# Patient Record
Sex: FEMALE | Race: WHITE | NOT HISPANIC OR LATINO | Employment: UNEMPLOYED | URBAN - NONMETROPOLITAN AREA
[De-identification: names, ages, dates, MRNs, and addresses within clinical notes are randomized per-mention and may not be internally consistent; named-entity substitution may affect disease eponyms.]

---

## 2023-02-23 LAB
GENETICS TEST NAME-DATA CONVERSION: NORMAL
LAB MOLECULAR CA TECHNICAL NOTES: NORMAL
MISCELLANEUOUS TEST RESULT: NORMAL
NAME OF SENDOUT TEST: NORMAL

## 2023-02-25 LAB — LAB MOLECULAR CA TECHNICAL NOTES: NORMAL

## 2023-04-14 LAB
GLUCOSE, 1 HR SCREEN, PREG: 181 MG/DL
HEMATOCRIT (%) IN BLOOD BY AUTOMATED COUNT: 38.7 % (ref 36–46)
HEMOGLOBIN (G/DL) IN BLOOD: 13.3 G/DL (ref 12–16)

## 2023-04-15 LAB
CHLAMYDIA TRACH., AMPLIFIED: NEGATIVE
N. GONORRHEA, AMPLIFIED: NEGATIVE
URINE CULTURE: NORMAL

## 2023-06-24 LAB — GROUP B STREP SCREEN: NORMAL

## 2023-11-11 PROBLEM — O24.410 DIET CONTROLLED GESTATIONAL DIABETES MELLITUS (GDM) IN THIRD TRIMESTER (HHS-HCC): Status: ACTIVE | Noted: 2023-11-11

## 2023-11-11 PROBLEM — O09.519 AMA (ADVANCED MATERNAL AGE) PRIMIGRAVIDA 35+ (HHS-HCC): Status: ACTIVE | Noted: 2023-11-11

## 2023-11-11 PROBLEM — N92.6 IRREGULAR MENSES: Status: ACTIVE | Noted: 2023-11-11

## 2023-11-11 PROBLEM — Z3A.36 36 WEEKS GESTATION OF PREGNANCY (HHS-HCC): Status: ACTIVE | Noted: 2023-11-11

## 2023-11-11 PROBLEM — R35.0 URINARY FREQUENCY: Status: ACTIVE | Noted: 2023-11-11

## 2023-11-11 PROBLEM — R82.998 URINE LEUKOCYTES: Status: ACTIVE | Noted: 2023-11-11

## 2023-11-11 PROBLEM — G40.909 EPILEPSY (MULTI): Status: ACTIVE | Noted: 2023-11-11

## 2023-11-11 PROBLEM — S06.9XAA TBI (TRAUMATIC BRAIN INJURY) (MULTI): Status: ACTIVE | Noted: 2023-11-11

## 2023-11-11 PROBLEM — O99.213 OBESITY AFFECTING PREGNANCY IN THIRD TRIMESTER (HHS-HCC): Status: ACTIVE | Noted: 2023-11-11

## 2024-05-05 ENCOUNTER — HOSPITAL ENCOUNTER (EMERGENCY)
Facility: HOSPITAL | Age: 44
Discharge: HOME | End: 2024-05-05
Payer: COMMERCIAL

## 2024-05-05 VITALS
DIASTOLIC BLOOD PRESSURE: 66 MMHG | RESPIRATION RATE: 16 BRPM | BODY MASS INDEX: 29.82 KG/M2 | HEIGHT: 67 IN | OXYGEN SATURATION: 100 % | HEART RATE: 65 BPM | SYSTOLIC BLOOD PRESSURE: 129 MMHG | TEMPERATURE: 97.8 F | WEIGHT: 190 LBS

## 2024-05-05 DIAGNOSIS — N91.2 AMENORRHEA: Primary | ICD-10-CM

## 2024-05-05 LAB — B-HCG SERPL-ACNC: <2 MIU/ML

## 2024-05-05 PROCEDURE — 99283 EMERGENCY DEPT VISIT LOW MDM: CPT

## 2024-05-05 PROCEDURE — 84702 CHORIONIC GONADOTROPIN TEST: CPT | Performed by: PHYSICIAN ASSISTANT

## 2024-05-05 PROCEDURE — 36415 COLL VENOUS BLD VENIPUNCTURE: CPT | Performed by: PHYSICIAN ASSISTANT

## 2024-05-05 ASSESSMENT — PAIN DESCRIPTION - FREQUENCY: FREQUENCY: CONSTANT/CONTINUOUS

## 2024-05-05 ASSESSMENT — COLUMBIA-SUICIDE SEVERITY RATING SCALE - C-SSRS
6. HAVE YOU EVER DONE ANYTHING, STARTED TO DO ANYTHING, OR PREPARED TO DO ANYTHING TO END YOUR LIFE?: NO
1. IN THE PAST MONTH, HAVE YOU WISHED YOU WERE DEAD OR WISHED YOU COULD GO TO SLEEP AND NOT WAKE UP?: NO
2. HAVE YOU ACTUALLY HAD ANY THOUGHTS OF KILLING YOURSELF?: NO

## 2024-05-05 ASSESSMENT — PAIN SCALES - GENERAL: PAINLEVEL_OUTOF10: 10 - WORST POSSIBLE PAIN

## 2024-05-05 ASSESSMENT — PAIN DESCRIPTION - LOCATION: LOCATION: ABDOMEN

## 2024-05-05 ASSESSMENT — PAIN DESCRIPTION - DESCRIPTORS: DESCRIPTORS: ACHING

## 2024-05-05 ASSESSMENT — PAIN DESCRIPTION - PAIN TYPE: TYPE: ACUTE PAIN

## 2024-05-05 ASSESSMENT — PAIN - FUNCTIONAL ASSESSMENT: PAIN_FUNCTIONAL_ASSESSMENT: 0-10

## 2024-05-05 NOTE — ED PROVIDER NOTES
"HPI   Chief Complaint   Patient presents with   • IUD compliacation     IUD pain that started 1 hour ago       44-year-old female with past medical history positive for brain injury as an infant and seizure disorder had IUD implanted 1 year ago states she had a missed menstrual cycle and tested herself for pregnancy at home test came back \"positive\"    Denies any vaginal discharge  And does not want a pelvic exam    She just wanted to confirm pregnancy test    States last menstrual period was April 4, 2024 patient thinks states it was \"early last month\"                          No data recorded                   Patient History   No past medical history on file.  No past surgical history on file.  No family history on file.  Social History     Tobacco Use   • Smoking status: Not on file   • Smokeless tobacco: Not on file   Substance Use Topics   • Alcohol use: Not on file   • Drug use: Not on file       Physical Exam   ED Triage Vitals [05/05/24 1641]   Temperature Heart Rate Respirations BP   36.6 °C (97.8 °F) 91 14 (!) 153/109      Pulse Ox Temp Source Heart Rate Source Patient Position   96 % Tympanic -- Sitting      BP Location FiO2 (%)     Left arm --       Physical Exam  Vitals and nursing note reviewed.   Constitutional:       General: She is not in acute distress.     Appearance: She is well-developed.   HENT:      Head: Normocephalic and atraumatic.   Eyes:      Conjunctiva/sclera: Conjunctivae normal.   Cardiovascular:      Rate and Rhythm: Normal rate and regular rhythm.      Heart sounds: No murmur heard.  Pulmonary:      Effort: Pulmonary effort is normal. No respiratory distress.      Breath sounds: Normal breath sounds.   Abdominal:      Palpations: Abdomen is soft.      Tenderness: There is no abdominal tenderness.   Musculoskeletal:         General: No swelling.      Cervical back: Neck supple.   Skin:     General: Skin is warm and dry.      Capillary Refill: Capillary refill takes less than 2 " seconds.   Neurological:      Mental Status: She is alert.   Psychiatric:         Mood and Affect: Mood normal.         ED Course & MDM   Diagnoses as of 05/05/24 1915   Amenorrhea       Medical Decision Making  Quantitative hCG performed and was less than 2    Told patient to follow-up with her OB/GYN Dr. Ayon for her amenorrhea the quantitative hCG was negative    Labs Reviewed  HUMAN CHORIONIC GONADOTROPIN, SERUM QUANTITATIVE - Normal          Procedure  Procedures     Magda Pizano PA-C  05/05/24 1916

## 2024-05-21 ENCOUNTER — APPOINTMENT (OUTPATIENT)
Dept: CARDIOLOGY | Facility: HOSPITAL | Age: 44
End: 2024-05-21
Payer: COMMERCIAL

## 2024-05-21 ENCOUNTER — APPOINTMENT (OUTPATIENT)
Dept: RADIOLOGY | Facility: HOSPITAL | Age: 44
End: 2024-05-21
Payer: COMMERCIAL

## 2024-05-21 ENCOUNTER — HOSPITAL ENCOUNTER (OUTPATIENT)
Facility: HOSPITAL | Age: 44
Setting detail: OBSERVATION
LOS: 1 days | Discharge: LEFT BEFORE DISCHARGE | End: 2024-05-22
Attending: INTERNAL MEDICINE | Admitting: INTERNAL MEDICINE
Payer: COMMERCIAL

## 2024-05-21 ENCOUNTER — HOSPITAL ENCOUNTER (EMERGENCY)
Facility: HOSPITAL | Age: 44
Discharge: OTHER INSTITUTION | End: 2024-05-21
Attending: FAMILY MEDICINE
Payer: COMMERCIAL

## 2024-05-21 VITALS
HEIGHT: 67 IN | BODY MASS INDEX: 29.82 KG/M2 | TEMPERATURE: 97.1 F | WEIGHT: 190 LBS | OXYGEN SATURATION: 99 % | HEART RATE: 79 BPM | SYSTOLIC BLOOD PRESSURE: 133 MMHG | DIASTOLIC BLOOD PRESSURE: 91 MMHG | RESPIRATION RATE: 21 BRPM

## 2024-05-21 DIAGNOSIS — R41.82 ALTERED MENTAL STATUS, UNSPECIFIED ALTERED MENTAL STATUS TYPE: ICD-10-CM

## 2024-05-21 DIAGNOSIS — R00.1 BRADYCARDIA: ICD-10-CM

## 2024-05-21 DIAGNOSIS — R56.9 SEIZURE (MULTI): ICD-10-CM

## 2024-05-21 DIAGNOSIS — Z98.890 HX OF CRANIOTOMY: ICD-10-CM

## 2024-05-21 DIAGNOSIS — R00.1 BRADYCARDIA: Primary | ICD-10-CM

## 2024-05-21 DIAGNOSIS — G40.919 BREAKTHROUGH SEIZURE (MULTI): Primary | ICD-10-CM

## 2024-05-21 LAB
ALBUMIN SERPL BCP-MCNC: 3.7 G/DL (ref 3.4–5)
ALP SERPL-CCNC: 72 U/L (ref 33–110)
ALT SERPL W P-5'-P-CCNC: 7 U/L (ref 7–45)
AMPHETAMINES UR QL SCN: NORMAL
ANION GAP SERPL CALC-SCNC: 14 MMOL/L (ref 10–20)
APPEARANCE UR: CLEAR
AST SERPL W P-5'-P-CCNC: 10 U/L (ref 9–39)
BARBITURATES UR QL SCN: NORMAL
BASOPHILS # BLD AUTO: 0.04 X10*3/UL (ref 0–0.1)
BASOPHILS NFR BLD AUTO: 0.5 %
BENZODIAZ UR QL SCN: NORMAL
BILIRUB SERPL-MCNC: 0.4 MG/DL (ref 0–1.2)
BILIRUB UR STRIP.AUTO-MCNC: NEGATIVE MG/DL
BNP SERPL-MCNC: 29 PG/ML (ref 0–99)
BUN SERPL-MCNC: 8 MG/DL (ref 6–23)
BZE UR QL SCN: NORMAL
CALCIUM SERPL-MCNC: 8.6 MG/DL (ref 8.6–10.3)
CANNABINOIDS UR QL SCN: NORMAL
CARDIAC TROPONIN I PNL SERPL HS: <3 NG/L (ref 0–13)
CARDIAC TROPONIN I PNL SERPL HS: <3 NG/L (ref 0–13)
CHLORIDE SERPL-SCNC: 108 MMOL/L (ref 98–107)
CO2 SERPL-SCNC: 18 MMOL/L (ref 21–32)
COLOR UR: NORMAL
CREAT SERPL-MCNC: 0.67 MG/DL (ref 0.5–1.05)
D DIMER PPP FEU-MCNC: 359 NG/ML FEU
EGFRCR SERPLBLD CKD-EPI 2021: >90 ML/MIN/1.73M*2
EOSINOPHIL # BLD AUTO: 0.29 X10*3/UL (ref 0–0.7)
EOSINOPHIL NFR BLD AUTO: 3.4 %
ERYTHROCYTE [DISTWIDTH] IN BLOOD BY AUTOMATED COUNT: 12.8 % (ref 11.5–14.5)
ETHANOL SERPL-MCNC: <10 MG/DL
FENTANYL+NORFENTANYL UR QL SCN: NORMAL
GLUCOSE BLD MANUAL STRIP-MCNC: 110 MG/DL (ref 74–99)
GLUCOSE SERPL-MCNC: 102 MG/DL (ref 74–99)
GLUCOSE UR STRIP.AUTO-MCNC: NORMAL MG/DL
HCG UR QL IA.RAPID: NEGATIVE
HCT VFR BLD AUTO: 43.8 % (ref 36–46)
HGB BLD-MCNC: 14.6 G/DL (ref 12–16)
HOLD SPECIMEN: NORMAL
HOLD SPECIMEN: NORMAL
IMM GRANULOCYTES # BLD AUTO: 0.03 X10*3/UL (ref 0–0.7)
IMM GRANULOCYTES NFR BLD AUTO: 0.4 % (ref 0–0.9)
KETONES UR STRIP.AUTO-MCNC: NEGATIVE MG/DL
LACTATE SERPL-SCNC: 0.8 MMOL/L (ref 0.4–2)
LEUKOCYTE ESTERASE UR QL STRIP.AUTO: NEGATIVE
LYMPHOCYTES # BLD AUTO: 3.06 X10*3/UL (ref 1.2–4.8)
LYMPHOCYTES NFR BLD AUTO: 35.7 %
MAGNESIUM SERPL-MCNC: 1.89 MG/DL (ref 1.6–2.4)
MCH RBC QN AUTO: 33 PG (ref 26–34)
MCHC RBC AUTO-ENTMCNC: 33.3 G/DL (ref 32–36)
MCV RBC AUTO: 99 FL (ref 80–100)
METHADONE UR QL SCN: NORMAL
MONOCYTES # BLD AUTO: 0.55 X10*3/UL (ref 0.1–1)
MONOCYTES NFR BLD AUTO: 6.4 %
MUCOUS THREADS #/AREA URNS AUTO: NORMAL /LPF
NEUTROPHILS # BLD AUTO: 4.6 X10*3/UL (ref 1.2–7.7)
NEUTROPHILS NFR BLD AUTO: 53.6 %
NITRITE UR QL STRIP.AUTO: NEGATIVE
NRBC BLD-RTO: 0 /100 WBCS (ref 0–0)
OPIATES UR QL SCN: NORMAL
OXYCODONE+OXYMORPHONE UR QL SCN: NORMAL
PCP UR QL SCN: NORMAL
PH UR STRIP.AUTO: 6 [PH]
PLATELET # BLD AUTO: 243 X10*3/UL (ref 150–450)
POTASSIUM SERPL-SCNC: 3.6 MMOL/L (ref 3.5–5.3)
PROT SERPL-MCNC: 6.3 G/DL (ref 6.4–8.2)
PROT UR STRIP.AUTO-MCNC: NORMAL MG/DL
RBC # BLD AUTO: 4.43 X10*6/UL (ref 4–5.2)
RBC # UR STRIP.AUTO: NEGATIVE /UL
RBC #/AREA URNS AUTO: NORMAL /HPF
SODIUM SERPL-SCNC: 136 MMOL/L (ref 136–145)
SP GR UR STRIP.AUTO: 1.02
SQUAMOUS #/AREA URNS AUTO: NORMAL /HPF
T4 FREE SERPL-MCNC: 0.84 NG/DL (ref 0.61–1.12)
TSH SERPL-ACNC: 4.66 MIU/L (ref 0.44–3.98)
TSH SERPL-ACNC: 4.66 MIU/L (ref 0.44–3.98)
UROBILINOGEN UR STRIP.AUTO-MCNC: NORMAL MG/DL
VALPROATE SERPL-MCNC: <10 UG/ML (ref 50–100)
WBC # BLD AUTO: 8.6 X10*3/UL (ref 4.4–11.3)
WBC #/AREA URNS AUTO: NORMAL /HPF

## 2024-05-21 PROCEDURE — 2550000001 HC RX 255 CONTRASTS: Mod: SE | Performed by: FAMILY MEDICINE

## 2024-05-21 PROCEDURE — 80165 DIPROPYLACETIC ACID FREE: CPT | Performed by: FAMILY MEDICINE

## 2024-05-21 PROCEDURE — 84484 ASSAY OF TROPONIN QUANT: CPT | Mod: 91 | Performed by: FAMILY MEDICINE

## 2024-05-21 PROCEDURE — 87491 CHLMYD TRACH DNA AMP PROBE: CPT | Mod: GENLAB | Performed by: FAMILY MEDICINE

## 2024-05-21 PROCEDURE — 2500000002 HC RX 250 W HCPCS SELF ADMINISTERED DRUGS (ALT 637 FOR MEDICARE OP, ALT 636 FOR OP/ED)

## 2024-05-21 PROCEDURE — 2500000001 HC RX 250 WO HCPCS SELF ADMINISTERED DRUGS (ALT 637 FOR MEDICARE OP)

## 2024-05-21 PROCEDURE — 82077 ASSAY SPEC XCP UR&BREATH IA: CPT

## 2024-05-21 PROCEDURE — 70498 CT ANGIOGRAPHY NECK: CPT

## 2024-05-21 PROCEDURE — 36415 COLL VENOUS BLD VENIPUNCTURE: CPT | Performed by: FAMILY MEDICINE

## 2024-05-21 PROCEDURE — 2500000004 HC RX 250 GENERAL PHARMACY W/ HCPCS (ALT 636 FOR OP/ED)

## 2024-05-21 PROCEDURE — 71045 X-RAY EXAM CHEST 1 VIEW: CPT | Mod: FOREIGN READ | Performed by: RADIOLOGY

## 2024-05-21 PROCEDURE — 81001 URINALYSIS AUTO W/SCOPE: CPT | Mod: 59 | Performed by: FAMILY MEDICINE

## 2024-05-21 PROCEDURE — 87040 BLOOD CULTURE FOR BACTERIA: CPT | Mod: GENLAB | Performed by: FAMILY MEDICINE

## 2024-05-21 PROCEDURE — 70496 CT ANGIOGRAPHY HEAD: CPT | Performed by: RADIOLOGY

## 2024-05-21 PROCEDURE — 81025 URINE PREGNANCY TEST: CPT | Performed by: FAMILY MEDICINE

## 2024-05-21 PROCEDURE — 71045 X-RAY EXAM CHEST 1 VIEW: CPT

## 2024-05-21 PROCEDURE — 70498 CT ANGIOGRAPHY NECK: CPT | Performed by: RADIOLOGY

## 2024-05-21 PROCEDURE — 80164 ASSAY DIPROPYLACETIC ACD TOT: CPT

## 2024-05-21 PROCEDURE — 83735 ASSAY OF MAGNESIUM: CPT | Performed by: FAMILY MEDICINE

## 2024-05-21 PROCEDURE — 84443 ASSAY THYROID STIM HORMONE: CPT

## 2024-05-21 PROCEDURE — 70551 MRI BRAIN STEM W/O DYE: CPT | Performed by: RADIOLOGY

## 2024-05-21 PROCEDURE — 82947 ASSAY GLUCOSE BLOOD QUANT: CPT

## 2024-05-21 PROCEDURE — 99222 1ST HOSP IP/OBS MODERATE 55: CPT

## 2024-05-21 PROCEDURE — 84439 ASSAY OF FREE THYROXINE: CPT

## 2024-05-21 PROCEDURE — 85025 COMPLETE CBC W/AUTO DIFF WBC: CPT | Performed by: FAMILY MEDICINE

## 2024-05-21 PROCEDURE — 85379 FIBRIN DEGRADATION QUANT: CPT | Performed by: FAMILY MEDICINE

## 2024-05-21 PROCEDURE — 70450 CT HEAD/BRAIN W/O DYE: CPT | Mod: 59

## 2024-05-21 PROCEDURE — 99285 EMERGENCY DEPT VISIT HI MDM: CPT | Mod: 25

## 2024-05-21 PROCEDURE — 82947 ASSAY GLUCOSE BLOOD QUANT: CPT | Mod: 59

## 2024-05-21 PROCEDURE — 83605 ASSAY OF LACTIC ACID: CPT | Performed by: FAMILY MEDICINE

## 2024-05-21 PROCEDURE — 80307 DRUG TEST PRSMV CHEM ANLYZR: CPT | Performed by: FAMILY MEDICINE

## 2024-05-21 PROCEDURE — 93005 ELECTROCARDIOGRAM TRACING: CPT

## 2024-05-21 PROCEDURE — 83880 ASSAY OF NATRIURETIC PEPTIDE: CPT | Performed by: FAMILY MEDICINE

## 2024-05-21 PROCEDURE — G0378 HOSPITAL OBSERVATION PER HR: HCPCS

## 2024-05-21 PROCEDURE — 70551 MRI BRAIN STEM W/O DYE: CPT

## 2024-05-21 PROCEDURE — 80053 COMPREHEN METABOLIC PANEL: CPT | Performed by: FAMILY MEDICINE

## 2024-05-21 PROCEDURE — 96372 THER/PROPH/DIAG INJ SC/IM: CPT

## 2024-05-21 PROCEDURE — 96360 HYDRATION IV INFUSION INIT: CPT | Mod: 59

## 2024-05-21 PROCEDURE — S4991 NICOTINE PATCH NONLEGEND: HCPCS

## 2024-05-21 PROCEDURE — 2500000001 HC RX 250 WO HCPCS SELF ADMINISTERED DRUGS (ALT 637 FOR MEDICARE OP): Mod: SE | Performed by: FAMILY MEDICINE

## 2024-05-21 PROCEDURE — 2500000004 HC RX 250 GENERAL PHARMACY W/ HCPCS (ALT 636 FOR OP/ED): Mod: SE | Performed by: FAMILY MEDICINE

## 2024-05-21 PROCEDURE — 70450 CT HEAD/BRAIN W/O DYE: CPT | Performed by: RADIOLOGY

## 2024-05-21 RX ORDER — ENOXAPARIN SODIUM 100 MG/ML
40 INJECTION SUBCUTANEOUS EVERY 12 HOURS SCHEDULED
Status: DISCONTINUED | OUTPATIENT
Start: 2024-05-21 | End: 2024-05-22 | Stop reason: HOSPADM

## 2024-05-21 RX ORDER — SODIUM CHLORIDE 9 MG/ML
125 INJECTION, SOLUTION INTRAVENOUS CONTINUOUS
Status: DISCONTINUED | OUTPATIENT
Start: 2024-05-21 | End: 2024-05-21 | Stop reason: HOSPADM

## 2024-05-21 RX ORDER — DIVALPROEX SODIUM 500 MG/1
500 TABLET, FILM COATED, EXTENDED RELEASE ORAL DAILY
Status: DISCONTINUED | OUTPATIENT
Start: 2024-05-21 | End: 2024-05-21 | Stop reason: HOSPADM

## 2024-05-21 RX ORDER — IBUPROFEN 200 MG
1 TABLET ORAL DAILY
Status: DISCONTINUED | OUTPATIENT
Start: 2024-05-21 | End: 2024-05-22 | Stop reason: HOSPADM

## 2024-05-21 RX ORDER — DIVALPROEX SODIUM 500 MG/1
500 TABLET, FILM COATED, EXTENDED RELEASE ORAL DAILY
COMMUNITY
End: 2024-05-22 | Stop reason: HOSPADM

## 2024-05-21 RX ORDER — ACETAMINOPHEN 325 MG/1
650 TABLET ORAL EVERY 6 HOURS PRN
Status: DISCONTINUED | OUTPATIENT
Start: 2024-05-21 | End: 2024-05-22 | Stop reason: HOSPADM

## 2024-05-21 RX ORDER — DIVALPROEX SODIUM 500 MG/1
500 TABLET, FILM COATED, EXTENDED RELEASE ORAL DAILY
Status: DISCONTINUED | OUTPATIENT
Start: 2024-05-21 | End: 2024-05-22

## 2024-05-21 RX ADMIN — ACETAMINOPHEN 650 MG: 325 TABLET ORAL at 17:16

## 2024-05-21 RX ADMIN — ENOXAPARIN SODIUM 40 MG: 40 INJECTION SUBCUTANEOUS at 20:33

## 2024-05-21 RX ADMIN — DIVALPROEX SODIUM 500 MG: 500 TABLET, EXTENDED RELEASE ORAL at 09:47

## 2024-05-21 RX ADMIN — SODIUM CHLORIDE 125 ML/HR: 9 INJECTION, SOLUTION INTRAVENOUS at 08:08

## 2024-05-21 RX ADMIN — DIVALPROEX SODIUM 500 MG: 500 TABLET, FILM COATED, EXTENDED RELEASE ORAL at 17:17

## 2024-05-21 RX ADMIN — IOHEXOL 75 ML: 350 INJECTION, SOLUTION INTRAVENOUS at 07:21

## 2024-05-21 RX ADMIN — NICOTINE 1 PATCH: 14 PATCH, EXTENDED RELEASE TRANSDERMAL at 17:16

## 2024-05-21 SDOH — SOCIAL STABILITY: SOCIAL INSECURITY: DO YOU FEEL ANYONE HAS EXPLOITED OR TAKEN ADVANTAGE OF YOU FINANCIALLY OR OF YOUR PERSONAL PROPERTY?: NO

## 2024-05-21 SDOH — SOCIAL STABILITY: SOCIAL INSECURITY: ABUSE: ADULT

## 2024-05-21 SDOH — SOCIAL STABILITY: SOCIAL INSECURITY: HAVE YOU HAD ANY THOUGHTS OF HARMING ANYONE ELSE?: NO

## 2024-05-21 SDOH — SOCIAL STABILITY: SOCIAL INSECURITY: HAVE YOU HAD THOUGHTS OF HARMING ANYONE ELSE?: NO

## 2024-05-21 SDOH — SOCIAL STABILITY: SOCIAL INSECURITY: HAS ANYONE EVER THREATENED TO HURT YOUR FAMILY OR YOUR PETS?: NO

## 2024-05-21 SDOH — SOCIAL STABILITY: SOCIAL INSECURITY: DOES ANYONE TRY TO KEEP YOU FROM HAVING/CONTACTING OTHER FRIENDS OR DOING THINGS OUTSIDE YOUR HOME?: NO

## 2024-05-21 SDOH — SOCIAL STABILITY: SOCIAL INSECURITY: WERE YOU ABLE TO COMPLETE ALL THE BEHAVIORAL HEALTH SCREENINGS?: YES

## 2024-05-21 SDOH — SOCIAL STABILITY: SOCIAL INSECURITY: ARE THERE ANY APPARENT SIGNS OF INJURIES/BEHAVIORS THAT COULD BE RELATED TO ABUSE/NEGLECT?: NO

## 2024-05-21 SDOH — SOCIAL STABILITY: SOCIAL INSECURITY: DO YOU FEEL UNSAFE GOING BACK TO THE PLACE WHERE YOU ARE LIVING?: NO

## 2024-05-21 SDOH — SOCIAL STABILITY: SOCIAL INSECURITY: ARE YOU OR HAVE YOU BEEN THREATENED OR ABUSED PHYSICALLY, EMOTIONALLY, OR SEXUALLY BY ANYONE?: NO

## 2024-05-21 ASSESSMENT — COGNITIVE AND FUNCTIONAL STATUS - GENERAL
DAILY ACTIVITIY SCORE: 20
DRESSING REGULAR UPPER BODY CLOTHING: A LITTLE
CLIMB 3 TO 5 STEPS WITH RAILING: A LITTLE
DRESSING REGULAR UPPER BODY CLOTHING: A LITTLE
MOBILITY SCORE: 20
TOILETING: A LITTLE
MOVING TO AND FROM BED TO CHAIR: A LITTLE
HELP NEEDED FOR BATHING: A LITTLE
DRESSING REGULAR LOWER BODY CLOTHING: A LITTLE
HELP NEEDED FOR BATHING: A LITTLE
STANDING UP FROM CHAIR USING ARMS: A LITTLE
CLIMB 3 TO 5 STEPS WITH RAILING: A LITTLE
WALKING IN HOSPITAL ROOM: A LITTLE
CLIMB 3 TO 5 STEPS WITH RAILING: A LITTLE
TOILETING: A LITTLE
PATIENT BASELINE BEDBOUND: NO
WALKING IN HOSPITAL ROOM: A LITTLE
DRESSING REGULAR UPPER BODY CLOTHING: A LITTLE
MOBILITY SCORE: 20
STANDING UP FROM CHAIR USING ARMS: A LITTLE
DRESSING REGULAR LOWER BODY CLOTHING: A LITTLE
TOILETING: A LITTLE
MOVING TO AND FROM BED TO CHAIR: A LITTLE
DRESSING REGULAR LOWER BODY CLOTHING: A LITTLE
STANDING UP FROM CHAIR USING ARMS: A LITTLE
DAILY ACTIVITIY SCORE: 20
DAILY ACTIVITIY SCORE: 20
HELP NEEDED FOR BATHING: A LITTLE
WALKING IN HOSPITAL ROOM: A LITTLE
MOBILITY SCORE: 20
MOVING TO AND FROM BED TO CHAIR: A LITTLE

## 2024-05-21 ASSESSMENT — COLUMBIA-SUICIDE SEVERITY RATING SCALE - C-SSRS

## 2024-05-21 ASSESSMENT — ACTIVITIES OF DAILY LIVING (ADL)
LACK_OF_TRANSPORTATION: NO
HEARING - LEFT EAR: FUNCTIONAL
JUDGMENT_ADEQUATE_SAFELY_COMPLETE_DAILY_ACTIVITIES: YES
TOILETING: INDEPENDENT
WALKS IN HOME: INDEPENDENT
DRESSING YOURSELF: INDEPENDENT
FEEDING YOURSELF: INDEPENDENT
HEARING - RIGHT EAR: FUNCTIONAL
ADEQUATE_TO_COMPLETE_ADL: YES
BATHING: INDEPENDENT
PATIENT'S MEMORY ADEQUATE TO SAFELY COMPLETE DAILY ACTIVITIES?: YES
GROOMING: INDEPENDENT

## 2024-05-21 ASSESSMENT — LIFESTYLE VARIABLES
PRESCIPTION_ABUSE_PAST_12_MONTHS: NO
SKIP TO QUESTIONS 9-10: 1
HOW OFTEN DO YOU HAVE 6 OR MORE DRINKS ON ONE OCCASION: NEVER
AUDIT-C TOTAL SCORE: 0
AUDIT-C TOTAL SCORE: 0
HOW OFTEN DO YOU HAVE A DRINK CONTAINING ALCOHOL: NEVER
SUBSTANCE_ABUSE_PAST_12_MONTHS: NO
HOW MANY STANDARD DRINKS CONTAINING ALCOHOL DO YOU HAVE ON A TYPICAL DAY: PATIENT DOES NOT DRINK

## 2024-05-21 ASSESSMENT — PAIN SCALES - GENERAL
PAINLEVEL_OUTOF10: 5 - MODERATE PAIN
PAINLEVEL_OUTOF10: 5 - MODERATE PAIN
PAINLEVEL_OUTOF10: 2
PAINLEVEL_OUTOF10: 10 - WORST POSSIBLE PAIN
PAINLEVEL_OUTOF10: 0 - NO PAIN

## 2024-05-21 ASSESSMENT — PAIN - FUNCTIONAL ASSESSMENT
PAIN_FUNCTIONAL_ASSESSMENT: 0-10

## 2024-05-21 ASSESSMENT — PATIENT HEALTH QUESTIONNAIRE - PHQ9
2. FEELING DOWN, DEPRESSED OR HOPELESS: NOT AT ALL
SUM OF ALL RESPONSES TO PHQ9 QUESTIONS 1 & 2: 0
1. LITTLE INTEREST OR PLEASURE IN DOING THINGS: NOT AT ALL

## 2024-05-21 ASSESSMENT — PAIN DESCRIPTION - PAIN TYPE: TYPE: ACUTE PAIN

## 2024-05-21 ASSESSMENT — PAIN DESCRIPTION - LOCATION
LOCATION: HEAD
LOCATION: HEAD

## 2024-05-21 ASSESSMENT — PAIN DESCRIPTION - DESCRIPTORS: DESCRIPTORS: ACHING

## 2024-05-21 NOTE — ED NOTES
[facility] [id number], [address], [phone]     Kandy Jolly, EMT  05/21/24 0971

## 2024-05-21 NOTE — ED NOTES
HR 46-54, patient easily arousable, c/o headache, Dr Nguyen aware of heart rate and headache. Goodman SILVA aware     Katie Mac RN  05/21/24 1670

## 2024-05-21 NOTE — DISCHARGE INSTRUCTIONS
Case discussed with Dr. Wilson, he accepted patient transfer to [facility].  Patient and family agreed.

## 2024-05-21 NOTE — ED PROVIDER NOTES
"HPI   Chief Complaint   Patient presents with    Seizures     Pt brought in from home by boyfriend for seizure approx 2130 last night, he states pt slumped over and fell out of bed and was responsive again after 1 minute but has been \"acting right\" since, pt confused on arrival to ED, blood glucose 110, pt reports headache as well, denies N/V, SOB or chest pain.        HPI  This 44-year-old female patient with a history of seizure disorder suspected seizure last night according to patient's boyfriend around 9/9/2020 however she has been having altered mental status, he noted that she slumped over last night and fell out of bed but not responsive afterward for 1 minute and has been not acting well ever since.  Patient and family is unable to provide reliable history to follow only simple command but appeared confused clear speech.  Able to move arms and legs.  No complaint of chest pain or short of breath abdominal pain vomiting or diarrhea.  No reported history of drug abuse.      Family history: Reviewed  Social history: Reviewed, denies substance abuse  Review of system: Try to obtain 10 review of system, review of system limited due to patient altered mental status.             Christ Coma Scale Score: 14                     Patient History   No past medical history on file.  No past surgical history on file.  No family history on file.  Social History     Tobacco Use    Smoking status: Not on file    Smokeless tobacco: Not on file   Substance Use Topics    Alcohol use: Not on file    Drug use: Not on file   Hours showed normal sinus rhythm rate of 64, anterior infarct age undetermined.  No ST-T wave elevation.  No STEMI.  Abnormal EKG.  I read this EKG.  Second EKG done at 855 hours shows sinus bradycardia with sinus with sinus arrhythmia, low voltage close complex rate of 52.  Borderline EKG, no STEMI.  I read this EKG.      Physical Exam   ED Triage Vitals [05/21/24 0650]   Temperatur sinus bradycardia with " sinus arrhythmia rate of 52.  Low voltage is complex.  Borderline EKG.  No STEMI.  Abnormal EKG IN at this EKG. Heart Rate Respirations BP   36.2 °C (97.1 °F) 62 16 (!) 136/92      Pulse Ox Temp Source Heart Rate Source Patient Position   100 % Temporal Monitor Lying      BP Location FiO2 (%)     Right arm --       Physical Exam    CONSTITUTIONAL: Well-developed well-nourished breathing without acute distress however she appears to be confused cannot answer question simple few words with clear speech but could not communicate comprehensively or in more detail.  Followed commands by moving arms personally that she can move her arms.  Patient is able to raise arms and move her legs and lift her legs against gravity no arms or leg drift noted.  No facial drooping or drooling.  She appeared confused with flat affect..  No ear nose chest discharge or bleed noted.    HENMT: The airway was intact. There was no ear or nose discharge. No drooling or stridor. Neck was supple. Talking and breathing comfortably.      EYES: Clear bilaterally, pupils equal, round and reactive to light.     CARDIOVASCULAR: Regular rate and rhythm. No friction rub or murmur good peripheral pulses no peripheral edema. Calf is nontender Homans' sign negative bilaterally.  Intact distal pulse intact sensation good skin perfusion.  Patient awake    RESPIRATORY: Patient was breathing comfortably. No tachypnea no respiratory distress.  On auscultation he has diminished breath sounds crackles in the lung bases.  However has good skin perfusion.     GASTROINTESTINAL: Abdomen soft positive bowel sounds noted right lower quadrant tenderness no guarding, rebound surgery no CVA tenderness 20 no pulsatile mass.    GENITOURINARY:  No costovertebral angle tenderness.     MUSCULOSKELETAL: Head was normocephalic atraumatic cervical thoracic lumbar spine nontender.  Chest was nontender  Both upper extremity good motion nontender intact distal pulse intact sensation.      NEUROLOGICAL: Able to talk clearly only few words as simple sentences any problem states that she can move her arms and legs but she was able to lift her arms and legs against gravity with no arms or leg drift noted.  No facial drooping or drooling or stridor she appeared confused.  Prior history of seizure disorder taking seizure medicine seizure type activity last night and fell and has altered mental status ever since history.  Patient with prolonged postictal was not confused condition concerning about stroke.  No nuchal rigidity.  Cervical thoracic and lumbar spine nontender chest wall nontender pelvis stable.  The EKG obtained at 653    SKIN: Skin normal color for race, warm, dry and intact. No evidence of trauma or lesions. PSYCHIATRIC: Awake alert and without acute distress. No obvious depression, no suicidal thoughts or ideation. Appropriate mood. Talking and normal tone.     HEME/LYMPH: No adenopathy or splenomegaly.        CRITICAL CARE    VITAL SIGNS:       heart rate 60 respiratory 19 blood pressure 152/71 pulse ox 92% on O2 through nasal cannula           DISPOSITION      ED Course & MDM   ED Course as of 05/21/24 0918   Tue May 21, 2024   0843 CT brain attack angio head and neck W and WO IV contrast [SP]      ED Course User Index  [SP] Josefina Nguyen MD         Diagnoses as of 05/21/24 0918   Breakthrough seizure (Multi)   Altered mental status, unspecified altered mental status type   Hx of craniotomy   Bradycardia       Medical Decision Making      Procedure  Procedures     Josefina Nguyen MD  05/21/24 4524       Josefina Nguyen MD  05/21/24 1012

## 2024-05-21 NOTE — PROGRESS NOTES
05/21/24 1530   Discharge Planning   Living Arrangements Spouse/significant other   Support Systems Spouse/significant other   Assistance Needed Patient is A&Ox3, independent with ADL's and uses no DME for ambulation at baseline, doesn't drive (patients boyfriend transports), room air at baseline.   Type of Residence Private residence   Number of Stairs to Enter Residence 0   Number of Stairs Within Residence 0   Do you have animals or pets at home? No   Who is requesting discharge planning? Provider   Home or Post Acute Services None   Patient expects to be discharged to: Home with boyfriend. Patient denies further home going needs.   Does the patient need discharge transport arranged? No   Financial Resource Strain   How hard is it for you to pay for the very basics like food, housing, medical care, and heating? Not very   Housing Stability   In the last 12 months, was there a time when you were not able to pay the mortgage or rent on time? N   In the last 12 months, how many places have you lived? 1   In the last 12 months, was there a time when you did not have a steady place to sleep or slept in a shelter (including now)? N   Transportation Needs   In the past 12 months, has lack of transportation kept you from medical appointments or from getting medications? no   In the past 12 months, has lack of transportation kept you from meetings, work, or from getting things needed for daily living? No

## 2024-05-21 NOTE — H&P
HPI    HPI: Rhonda Carrasco is a 44 y.o. 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 History obtained from patient and her boyfriend at bedside who state that her last known well was yesterday 5/20. Her boyfriend states that he noticed she slumped over on the bed around 9pm 5/20 and was unresponsive for about 1-2 minutes. No loss of bowels or bladder, no vomiting. She was in a post-ictal state for about an hour or so after. Of note, patient said that she did have a headache all day yesterday but denied any complaints otherwise. She has not followed with Neurology in about a year, used to see a provider at [facility]. She has been taking her Depakote on and off for about the past year but says she is normally pretty good about not missing any doses, although no recent fill history seen. She was reportedly off of her Depakote during pregnancy. Speech is slurred but her boyfriend states that this is her baseline. She did have a similar episode about a year ago. Denies alcohol or drug use. She denies chest pain, SOB, abdominal pain, n/v, diarrhea, urinary symptoms, or numbness/tingling.     ED course:     Vitals: Temp 97.1 F, HR 62, RR 16, /92, SpO2 100% on RA    Labs:   - CBC: Unremarkable   - CMP: Bicarb 18, T protein 6.3   - Troponin: 3, 3   - Lactate: 0.8   - BNP: 29   - D-dimer: 359   - UDS: Negative   - UA: Negative     Imaging:   - CXR: Mild hypoinflated lungs  - CT head wo con: No acute intracranial process, encephalomalacia in right cerebral hemisphere similar to prior study   - CT angio head/neck w/ and wo con: Slight tortuosity and ectasia of distal internal carotid arteries bilaterally, likely normal variation, otherwise unremarkable   - MRI brain wo con: No evidence of mass, infarction or hemorrhage. Postoperative changes associated with craniotomy.   - EKG: Sinus bradycardia, rate 52 bpm, no  ST/T changes     Micro:   - Blood culture: Collected    Interventions: Transferred to [facility] for further management     ROS: 12 points review of system is negative except as stated in the HPI above.   Past Medical History     Past Medical History:   Diagnosis Date    Seizures (Multi)       Surgical History   No past surgical history on file.  Family History   No family history on file.  Social History     Social History     Socioeconomic History    Marital status: Significant Other     Spouse name: Not on file    Number of children: Not on file    Years of education: Not on file    Highest education level: Not on file   Occupational History    Not on file   Tobacco Use    Smoking status: Every Day     Current packs/day: 0.50     Types: Cigarettes    Smokeless tobacco: Never   Vaping Use    Vaping status: Every Day   Substance and Sexual Activity    Alcohol use: Not Currently    Drug use: Never    Sexual activity: Not on file   Other Topics Concern    Not on file   Social History Narrative    Not on file     Social Determinants of Health     Financial Resource Strain: Not on File (2021)    Received from [facility]     Financial Resource Strain     Financial Resource Strain: 0   Food Insecurity: Not on File (2021)    Received from [facility]     Food Insecurity     Food: 0   Transportation Needs: Not on File (2021)    Received from [facility]     Transportation Needs     Transportation: 0   Physical Activity: Not on File (2021)    Received from [facility]     Physical Activity     Physical Activity: 0   Stress: At Risk (6/10/2021)    Received from [facility]     Stress     Stress: 2   Social Connections: Not at Risk (6/10/2021)    Received from [facility]     Social Connections     Social Connections and Isolation: 1   Intimate Partner Violence: Not on file   Housing Stability: Not on File (2021)    Received from [facility]     Housing Stability     Housin       Tobacco Use: High Risk (2024)    Patient History      "Smoking Tobacco Use: Every Day     Smokeless Tobacco Use: Never     Passive Exposure: Not on file        Social History     Substance and Sexual Activity   Alcohol Use Not Currently      Allergies     Allergies   Allergen Reactions    Dilantin [Phenytoin Sodium Extended] Unknown      Meds    Scheduled medications  divalproex, 500 mg, oral, Daily  enoxaparin, 40 mg, subcutaneous, q12h DAVID      Continuous medications     PRN medications  PRN medications: acetaminophen   Objective     Vitals  Visit Vitals  /73   Pulse 56   Temp 36.5 °C (97.7 °F) (Temporal)   Resp 20   Ht 1.702 m (5' 7\")   Wt 116 kg (256 lb 9.6 oz)   LMP 03/01/2024   SpO2 98%   BMI 40.19 kg/m²   OB Status Unknown   Smoking Status Every Day   BSA 2.34 m²        Physical Examination:  Gen: well appearing, in NAD  HEENT: AT/NC, no conjunctival pallor, anicteric sclera, EOMI   Neck: supple, no LAD/JVD  Chest: CTAB, no wheezing / labored respirations  CVS: RRR, no murmurs  Abd: soft, flat, NT/ND, BS+  Ext: no cyanosis/clubbing or pedal edema  Neuro: AOx3, CN 2-12 intact, motor 5/5 globally, sensation intact    I/Os  No intake or output data in the 24 hours ending 05/21/24 1512    Vent Settings         Labs:   Results from last 72 hours   Lab Units 05/21/24  0748   SODIUM mmol/L 136   POTASSIUM mmol/L 3.6   CHLORIDE mmol/L 108*   CO2 mmol/L 18*   BUN mg/dL 8   CREATININE mg/dL 0.67   GLUCOSE mg/dL 102*   CALCIUM mg/dL 8.6   ANION GAP mmol/L 14   EGFR mL/min/1.73m*2 >90      Results from last 72 hours   Lab Units 05/21/24  0747   WBC AUTO x10*3/uL 8.6   HEMOGLOBIN g/dL 14.6   HEMATOCRIT % 43.8   PLATELETS AUTO x10*3/uL 243   NEUTROS PCT AUTO % 53.6   LYMPHS PCT AUTO % 35.7   MONOS PCT AUTO % 6.4   EOS PCT AUTO % 3.4      Lab Results   Component Value Date    CALCIUM 8.6 05/21/2024      No results found for: \"CRP\"   [unfilled]     Micro/ID:   No results found for the last 90 days.                   No lab exists for component: \"AGALPCRNB\"   .ID  Lab " Results   Component Value Date    URINECULTURE MIXED URETHRAL ZAKI. 04/14/2023     Images    MR brain wo IV contrast  Narrative: Interpreted By:  Jose Pierre,   STUDY:  MR BRAIN WO IV CONTRAST;  5/21/2024 11:01 am      INDICATION:  Signs/Symptoms:Altered mental status.      COMPARISON:  CT May 21st.      ACCESSION NUMBER(S):  [id number]      ORDERING CLINICIAN:  NEEMA CHANEL      TECHNIQUE:  The brain was studied in the sagittal, axial and coronal planes  utilizing FLAIR, T1 and T2 weighted images.      FINDINGS:  Postoperative changes previously demonstrated in the right frontal  cortex with associated craniotomy including ex vacuo dilatation of  the right lateral ventricle are unchanged compared to the previous  exam. There is a normal-size ventricular system.  There is no  evidence of intracranial mass or extra-axial collection.  The skull  base, paranasal sinuses and orbital structures are unremarkable.  Diffusion weighted images and associated ADC maps of the brain were  unremarkable.  There is no evidence of diffusion restriction to  suggest the presence of acute infarction. Gradient echo T2 weighted  images fail to demonstrate hemosiderin deposition or other evidence  of hemorrhage.      Impression: * There is no evidence of mass, infarction or hemorrhage.  *Postoperative changes as previously demonstrated and described      MACRO:  none      Signed by: Jose Pierre 5/21/2024 11:16 AM  Dictation workstation:   [id number]  ECG 12 lead  Sinus bradycardia with sinus arrhythmia  Low voltage QRS  Borderline ECG  When compared with ECG of 21-MAY-2024 06:53, (unconfirmed)  No significant change was found  ECG 12 lead  Normal sinus rhythm  Cannot rule out Anterior infarct (cited on or before 27-SEP-2014)  Abnormal ECG  When compared with ECG of 27-SEP-2014 16:02,  No significant change was found  XR chest 1 view  Narrative: STUDY:  Chest Radiograph; 05/21/2024 7:43 AM  INDICATION:  Altered mental  status.  Seizure last night.  COMPARISON:  None.  ACCESSION NUMBER(S):  [id number]  ORDERING CLINICIAN:  NEEMA CHANEL  TECHNIQUE:  Frontal chest was obtained at 07:33:00 hours.  FINDINGS:  CARDIOMEDIASTINAL SILHOUETTE:  Cardiomediastinal silhouette is upper normal in size.     LUNGS:  Mildly hypoinflated lungs which appear clear.     ABDOMEN:  No remarkable upper abdominal findings.     BONES:  No acute osseous changes.  Impression: Mildly hypoinflated lungs which appear clear. No acute cardiopulmonary  abnormality is apparent.  Signed by Gage Nj MD  CT brain attack angio head and neck W and WO IV contrast  Narrative: Interpreted By:  Jose Pierre,   STUDY:  CT BRAIN ATTACK ANGIO HEAD AND NECK W AND WO IV CONTRAST;  5/21/2024  7:20 am      INDICATION:  Signs/Symptoms:cva.      COMPARISON:  None.      ACCESSION NUMBER(S):  [id number]      ORDERING CLINICIAN:  OLMAN KINCAID      TECHNIQUE:  Following intravenous injection of contrast material, CT was acquired  from the aortic arch to the vertex of the skull. Multiplanar  reconstructions and 3D reconstructions were made.3D reconstructions,  MIPs, Volume Rendered, or Surface Shading image were performed at a  separate workstation      FINDINGS:  Chest      The aortic arch and origin of great vessels are normal. The  visualized mediastinum and pulmonary apices are normal.      Neck      Right carotid  The common carotid artery is normal. The bifurcation is normal. There  is tortuosity and dilatation of the distal 3rd without other  supporting evidence for fibromuscular dysplasia. The cervical,  petrous and cavernous portions are normal.      Left carotid  The common carotid artery is normal. The bifurcation is normal. There  is tortuosity and slight dilatation of the distal 3rd of the right  internal carotid artery without other supporting evidence for fibrous  dysplasia. The cervical, petrous and cavernous portions are normal.      Vertebrals      The  vertebral arteries are symmetrical. Their origin is are normal.  No evidence of compression or filling defect in the cervical  portions. The horizontal intradural portions are normal. The  vertebrobasilar junction is normal. The basilar artery is normal.      Soft tissue neck          There is no evidence of mass, cyst or adenopathy within the neck      Intracranial      There is no evidence of aneurysm, vascular malformation or branch  occlusion.      Impression: * CT angiography of the carotid and vertebral circulation is within  normal limits. *There is slight tortuosity and ectasia of the distal  internal carotid arteries bilaterally which likely represents normal  variation. Other manifestations of fibromuscular dysplasia are not  present.      MACRO:  none      Signed by: Jsoe Pierre 5/21/2024 7:49 AM  Dictation workstation:   [id number]  CT brain attack head wo IV contrast  Narrative: Interpreted By:  Catarino Sanchez,   STUDY:  CT BRAIN ATTACK HEAD WO IV CONTRAST;  5/21/2024 7:15 am      INDICATION:  Signs/Symptoms:cva.      COMPARISON:  03/27/2014      ACCESSION NUMBER(S):  [id number]      ORDERING CLINICIAN:  OLMAN KINCAID      TECHNIQUE:  Unenhanced images were obtained through the brain.      FINDINGS:  There are postsurgical changes relating to a right-sided craniotomy.  There is encephalomalacia in the right cerebral hemisphere, similar  to the prior study. The ventricles are unchanged in size and  position. Gray-white differentiation appears to be maintained. There  is no mass effect or midline shift. No acute intracranial hemorrhage  is identified. No extra-axial fluid collections are seen. No  intraparenchymal mass lesions are identified.  Bone windows  demonstrate no evidence of an acute calvarial fracture.      Impression: No evidence of an acute intracranial process.      MACRO:  Catarino Sanchez discussed the significance and urgency of this critical  finding via epic secure chat with  OLMAN  CSERNYIK on 5/21/2024 at 7:45  am.  (**-RCF-**) Findings:  See findings.      Signed by: Catarino Sanchez 5/21/2024 7:46 AM  Dictation workstation:   [id number]    Assessment and Plan    Rhonda Carrasco is a 44 y.o. 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 Transferred to [facility] for further management.     Acute Medical Issues     # Breakthrough Seizure   # Hx Epilepsy, TBI s/p craniotomy   - Likely 2/2 medication non-compliance   - Last known normal on 5/20 prior to 9pm  - CT head wo con with no acute abnormality   - CT angio head and neck w/ and wo con unremarkable  - MRI brain wo con unremarkable despite postoperative changes from craniotomy   - Ethanol level and UDS negative   - Depakote level ordered and pending   - Continue Depakote 500mg daily   - Neurology consulted, appreciate recs   - Seizure precautions     # Bradycardia   - EKG in ED showed sinus bradycardia, HR 52 bpm   - TSH ordered and pending   - ECHO ordered   - On telemetry     Chronic Medical Issues     # Tobacco Abuse   - Nicotine patch ordered   - Encourage cessation     F: PO intake & IVF PRN   E: Replete as needed   N: Regular diet  GI ppx: None  DVT ppx: Lovenox Subq  Antibiotics: None  Tubes/Lines/Drains: PIV    Code Status: Full Code   Emergency Contact: Extended Emergency Contact Information  Primary Emergency Contact: JeniseKenny Phone: [phone]  Relation: Significant Other     Disposition: 44 y.o.female admitted for breakthrough seizure. eLOS < 48 hrs.     Sandhya Whitney, DO   PGY-2 Internal Medicine

## 2024-05-22 ENCOUNTER — APPOINTMENT (OUTPATIENT)
Dept: CARDIOLOGY | Facility: HOSPITAL | Age: 44
End: 2024-05-22
Payer: COMMERCIAL

## 2024-05-22 VITALS
OXYGEN SATURATION: 97 % | SYSTOLIC BLOOD PRESSURE: 134 MMHG | BODY MASS INDEX: 40.27 KG/M2 | HEART RATE: 69 BPM | TEMPERATURE: 97 F | WEIGHT: 256.6 LBS | RESPIRATION RATE: 20 BRPM | DIASTOLIC BLOOD PRESSURE: 96 MMHG | HEIGHT: 67 IN

## 2024-05-22 LAB
ALBUMIN SERPL BCP-MCNC: 3.5 G/DL (ref 3.4–5)
ALBUMIN SERPL BCP-MCNC: 3.7 G/DL (ref 3.4–5)
ALP SERPL-CCNC: 73 U/L (ref 33–110)
ALT SERPL W P-5'-P-CCNC: 10 U/L (ref 7–45)
ANION GAP SERPL CALC-SCNC: 10 MMOL/L (ref 10–20)
AORTIC VALVE MEAN GRADIENT: 5.7 MMHG
AORTIC VALVE PEAK VELOCITY: 1.55 M/S
AST SERPL W P-5'-P-CCNC: 9 U/L (ref 9–39)
AV PEAK GRADIENT: 9.6 MMHG
AVA (PEAK VEL): 2.87 CM2
AVA (VTI): 2.86 CM2
BILIRUB DIRECT SERPL-MCNC: 0.1 MG/DL (ref 0–0.3)
BILIRUB SERPL-MCNC: 0.4 MG/DL (ref 0–1.2)
BUN SERPL-MCNC: 8 MG/DL (ref 6–23)
C TRACH RRNA SPEC QL NAA+PROBE: NEGATIVE
CALCIUM SERPL-MCNC: 8.8 MG/DL (ref 8.6–10.3)
CHLORIDE SERPL-SCNC: 106 MMOL/L (ref 98–107)
CO2 SERPL-SCNC: 27 MMOL/L (ref 21–32)
CREAT SERPL-MCNC: 0.74 MG/DL (ref 0.5–1.05)
EGFRCR SERPLBLD CKD-EPI 2021: >90 ML/MIN/1.73M*2
EJECTION FRACTION APICAL 4 CHAMBER: 68.8
ERYTHROCYTE [DISTWIDTH] IN BLOOD BY AUTOMATED COUNT: 13.1 % (ref 11.5–14.5)
GLUCOSE SERPL-MCNC: 99 MG/DL (ref 74–99)
HCT VFR BLD AUTO: 43.5 % (ref 36–46)
HGB BLD-MCNC: 14.2 G/DL (ref 12–16)
LEFT ATRIUM VOLUME AREA LENGTH INDEX BSA: 23.2 ML/M2
LEFT VENTRICLE INTERNAL DIMENSION DIASTOLE: 4.64 CM (ref 3.5–6)
LEFT VENTRICULAR OUTFLOW TRACT DIAMETER: 2 CM
LV EJECTION FRACTION BIPLANE: 67 %
MAGNESIUM SERPL-MCNC: 1.9 MG/DL (ref 1.6–2.4)
MCH RBC QN AUTO: 32 PG (ref 26–34)
MCHC RBC AUTO-ENTMCNC: 32.6 G/DL (ref 32–36)
MCV RBC AUTO: 98 FL (ref 80–100)
MITRAL VALVE E/A RATIO: 1.32
MITRAL VALVE E/E' RATIO: 7.06
N GONORRHOEA DNA SPEC QL PROBE+SIG AMP: NEGATIVE
NRBC BLD-RTO: 0 /100 WBCS (ref 0–0)
PHOSPHATE SERPL-MCNC: 3.8 MG/DL (ref 2.5–4.9)
PLATELET # BLD AUTO: 238 X10*3/UL (ref 150–450)
POTASSIUM SERPL-SCNC: 4.1 MMOL/L (ref 3.5–5.3)
PROT SERPL-MCNC: 6.2 G/DL (ref 6.4–8.2)
RBC # BLD AUTO: 4.44 X10*6/UL (ref 4–5.2)
RIGHT VENTRICLE FREE WALL PEAK S': 17 CM/S
RIGHT VENTRICLE PEAK SYSTOLIC PRESSURE: 30.2 MMHG
SODIUM SERPL-SCNC: 139 MMOL/L (ref 136–145)
TRICUSPID ANNULAR PLANE SYSTOLIC EXCURSION: 2.7 CM
WBC # BLD AUTO: 7.6 X10*3/UL (ref 4.4–11.3)

## 2024-05-22 PROCEDURE — 99238 HOSP IP/OBS DSCHRG MGMT 30/<: CPT | Performed by: INTERNAL MEDICINE

## 2024-05-22 PROCEDURE — 99232 SBSQ HOSP IP/OBS MODERATE 35: CPT

## 2024-05-22 PROCEDURE — 2500000002 HC RX 250 W HCPCS SELF ADMINISTERED DRUGS (ALT 637 FOR MEDICARE OP, ALT 636 FOR OP/ED)

## 2024-05-22 PROCEDURE — 2500000001 HC RX 250 WO HCPCS SELF ADMINISTERED DRUGS (ALT 637 FOR MEDICARE OP): Performed by: PSYCHIATRY & NEUROLOGY

## 2024-05-22 PROCEDURE — 2500000006 HC RX 250 W HCPCS SELF ADMINISTERED DRUGS (ALT 637 FOR ALL PAYERS): Performed by: PSYCHIATRY & NEUROLOGY

## 2024-05-22 PROCEDURE — 83735 ASSAY OF MAGNESIUM: CPT

## 2024-05-22 PROCEDURE — G0378 HOSPITAL OBSERVATION PER HR: HCPCS

## 2024-05-22 PROCEDURE — S4991 NICOTINE PATCH NONLEGEND: HCPCS

## 2024-05-22 PROCEDURE — 2500000004 HC RX 250 GENERAL PHARMACY W/ HCPCS (ALT 636 FOR OP/ED)

## 2024-05-22 PROCEDURE — 84075 ASSAY ALKALINE PHOSPHATASE: CPT | Performed by: PSYCHIATRY & NEUROLOGY

## 2024-05-22 PROCEDURE — 93306 TTE W/DOPPLER COMPLETE: CPT

## 2024-05-22 PROCEDURE — 2500000001 HC RX 250 WO HCPCS SELF ADMINISTERED DRUGS (ALT 637 FOR MEDICARE OP)

## 2024-05-22 PROCEDURE — 36415 COLL VENOUS BLD VENIPUNCTURE: CPT | Performed by: PSYCHIATRY & NEUROLOGY

## 2024-05-22 PROCEDURE — 85027 COMPLETE CBC AUTOMATED: CPT

## 2024-05-22 PROCEDURE — 84100 ASSAY OF PHOSPHORUS: CPT

## 2024-05-22 PROCEDURE — 36415 COLL VENOUS BLD VENIPUNCTURE: CPT

## 2024-05-22 PROCEDURE — 96372 THER/PROPH/DIAG INJ SC/IM: CPT

## 2024-05-22 PROCEDURE — 99222 1ST HOSP IP/OBS MODERATE 55: CPT | Performed by: PSYCHIATRY & NEUROLOGY

## 2024-05-22 RX ORDER — VALPROIC ACID 250 MG/5ML
1000 SOLUTION ORAL ONCE
Status: COMPLETED | OUTPATIENT
Start: 2024-05-22 | End: 2024-05-22

## 2024-05-22 RX ORDER — DIVALPROEX SODIUM 500 MG/1
1000 TABLET, FILM COATED, EXTENDED RELEASE ORAL DAILY
Status: DISCONTINUED | OUTPATIENT
Start: 2024-05-22 | End: 2024-05-22 | Stop reason: HOSPADM

## 2024-05-22 RX ORDER — DIVALPROEX SODIUM 500 MG/1
1000 TABLET, FILM COATED, EXTENDED RELEASE ORAL DAILY
Qty: 30 TABLET | Refills: 0 | Status: SHIPPED | OUTPATIENT
Start: 2024-05-23

## 2024-05-22 RX ADMIN — VALPROIC ACID 1000 MG: 250 SOLUTION ORAL at 11:49

## 2024-05-22 RX ADMIN — ENOXAPARIN SODIUM 40 MG: 40 INJECTION SUBCUTANEOUS at 08:45

## 2024-05-22 RX ADMIN — DIVALPROEX SODIUM 1000 MG: 500 TABLET, FILM COATED, EXTENDED RELEASE ORAL at 11:49

## 2024-05-22 RX ADMIN — DIVALPROEX SODIUM 500 MG: 500 TABLET, FILM COATED, EXTENDED RELEASE ORAL at 08:45

## 2024-05-22 RX ADMIN — NICOTINE 1 PATCH: 14 PATCH, EXTENDED RELEASE TRANSDERMAL at 08:46

## 2024-05-22 ASSESSMENT — PAIN - FUNCTIONAL ASSESSMENT
PAIN_FUNCTIONAL_ASSESSMENT: 0-10
PAIN_FUNCTIONAL_ASSESSMENT: 0-10

## 2024-05-22 ASSESSMENT — PAIN SCALES - GENERAL
PAINLEVEL_OUTOF10: 0 - NO PAIN
PAINLEVEL_OUTOF10: 0 - NO PAIN

## 2024-05-22 NOTE — HOSPITAL COURSE
Rhonda Carrasco is a 44 y.o. 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 Transferred to [facility] for further management. In the ED, vitals were as follows: T 97.1 F, HR 62, RR 16, /92, SpO2 100% on RA. CBC and CMP relatively unremarkable, D-dimer mildly elevated at 359, UDS and UA negative. Chest X-ray showed mild hypoinflated lungs. CT head wo con showed no acute intracranial process, encephalomalacia in right cerebral hemisphere similar to prior study. CT angio head/neck w/ and wo con showed slight tortuosity and ectasia of distal internal carotid arteries bilaterally, likely normal variation, otherwise unremarkable. MRI brain wo con showed no evidence of mass, infarction or hemorrhage. Postoperative changes associated with craniotomy. EKG showed sinus bradycardia, rate 52 bpm, no ST/T change. She was transferred to [facility] and admitted for breakthrough seizure due to medication non-compliance.     Depakote level was subtherapeutic and Neurology was consulted who recommended loading with 1,000mg IV and starting Depakote ER 1,000mg daily. Echo was ordered and done on 5/22 which showed EF 65-70%. Repeat depakote level ordered for 5/23 AM but notified by RN that patient demanding to leave against medical advice.     Patient educated on risks of leaving AMA which included recurrent seizure, need for intubation, or possible death. Her and her fiance expressed understanding but still requesting to leave. Script sent for Depakote 1,000mg daily and follow up to be scheduled with Neurology. Patient signed paperwork and discharged AMA.

## 2024-05-22 NOTE — CARE PLAN
The patient's goals for the shift include  safety     The clinical goals for the shift include  safety    Problem: Fall/Injury  Goal: Not fall by end of shift  Outcome: Progressing  Goal: Be free from injury by end of the shift  Outcome: Progressing  Goal: Verbalize understanding of personal risk factors for fall in the hospital  Outcome: Progressing  Goal: Verbalize understanding of risk factor reduction measures to prevent injury from fall in the home  Outcome: Progressing  Goal: Pace activities to prevent fatigue by end of the shift  Outcome: Progressing     Problem: Pain  Goal: My pain/discomfort is manageable  Outcome: Progressing     Problem: Safety  Goal: Patient will be injury free during hospitalization  Outcome: Progressing  Goal: I will remain free of falls  Outcome: Progressing     Problem: Daily Care  Goal: Daily care needs are met  Outcome: Progressing     Problem: Psychosocial Needs  Goal: Demonstrates ability to cope with hospitalization/illness  Outcome: Progressing  Goal: Collaborate with me, my family, and caregiver to identify my specific goals  Outcome: Progressing     Problem: Discharge Barriers  Goal: My discharge needs are met  Outcome: Progressing

## 2024-05-22 NOTE — SIGNIFICANT EVENT
Patient threatening to leave AMA. Patient and fiance at bedside and counseled on risks of leaving which include another seizure, intubation, or possible death. Patient expressed verbal understanding. Paperwork signed and patient left against medical advice.

## 2024-05-22 NOTE — CARE PLAN
Problem: Fall/Injury  Goal: Not fall by end of shift  5/22/2024 1504 by Joanna Coleman RN  Outcome: Progressing  5/22/2024 1503 by Joanna Coleman RN  Outcome: Progressing  Goal: Be free from injury by end of the shift  5/22/2024 1504 by Joanna Coleman RN  Outcome: Progressing  5/22/2024 1503 by Joanna Coleman RN  Outcome: Progressing  Goal: Verbalize understanding of personal risk factors for fall in the hospital  5/22/2024 1504 by Joanna Coleman RN  Outcome: Progressing  5/22/2024 1503 by Joanna Coleman RN  Outcome: Progressing  Goal: Verbalize understanding of risk factor reduction measures to prevent injury from fall in the home  5/22/2024 1504 by Joanna Coleman RN  Outcome: Progressing  5/22/2024 1503 by Joanna Coleman RN  Outcome: Progressing  Goal: Pace activities to prevent fatigue by end of the shift  5/22/2024 1504 by Joanna Coleman RN  Outcome: Progressing  5/22/2024 1503 by Joanna Coleman RN  Outcome: Progressing     Problem: Seizure  Goal: I will remain free from seizures  Outcome: Progressing

## 2024-05-22 NOTE — PROGRESS NOTES
05/22/24 0936   Discharge Planning   Living Arrangements Spouse/significant other   Support Systems Spouse/significant other   Assistance Needed Patient is A&Ox3, independent with ADL's and uses no DME for ambulation at baseline, doesn't drive (patient's boyfriend transports), room air   Type of Residence Private residence   Number of Stairs to Enter Residence 0   Number of Stairs Within Residence 0   Do you have animals or pets at home? No   Who is requesting discharge planning? Provider   Home or Post Acute Services None   Patient expects to be discharged to: Home with boyfriend. Patient denies further home going needs.   Does the patient need discharge transport arranged? No

## 2024-05-22 NOTE — PROGRESS NOTES
Patient: Rhonda NUR Workman Age: 44 y.o.   Gender: female Room/bed: 228/228-A     Attending: Matt Umanzor DO  Code Status:  Full Code    Overnight Events  None     Subjective  Patient seen and examined, denies any complaints.     Objective:  Physical Exam  Constitutional:       Appearance: Normal appearance.   Cardiovascular:      Rate and Rhythm: Normal rate and regular rhythm.      Heart sounds: Normal heart sounds. No murmur heard.     No friction rub. No gallop.   Pulmonary:      Effort: Pulmonary effort is normal.      Breath sounds: Normal breath sounds. No wheezing or rhonchi.   Abdominal:      General: Bowel sounds are normal. There is no distension.      Palpations: Abdomen is soft.      Tenderness: There is no abdominal tenderness.   Musculoskeletal:         General: No swelling.   Skin:     General: Skin is warm and dry.      Findings: No bruising or rash.   Neurological:      General: No focal deficit present.      Mental Status: She is alert and oriented to person, place, and time.   Psychiatric:         Mood and Affect: Mood normal.         Thought Content: Thought content normal.         Judgment: Judgment normal.          Temp:  [36.1 °C (97 °F)-36.9 °C (98.4 °F)] 36.1 °C (97 °F)  Heart Rate:  [66-76] 69  Resp:  [16-20] 20  BP: ()/(65-96) 134/96      Intake/Output Summary (Last 24 hours) at 5/22/2024 1630  Last data filed at 5/22/2024 1350  Gross per 24 hour   Intake 770 ml   Output --   Net 770 ml       Vitals:    05/21/24 1419   Weight: 116 kg (256 lb 9.6 oz)             I/Os    Intake/Output Summary (Last 24 hours) at 5/22/2024 1630  Last data filed at 5/22/2024 1350  Gross per 24 hour   Intake 770 ml   Output --   Net 770 ml       Labs:   CBC:  Recent Labs     05/22/24  0618 05/21/24  0747 06/28/23  0845   WBC 7.6 8.6 11.1   HGB 14.2 14.6 12.3   HCT 43.5 43.8 36.1    243 230   MCV 98 99 97     CMP:  Recent Labs     05/22/24  0618 05/21/24  0748    136   K 4.1 3.6     "108*   CO2 27 18*   ANIONGAP 10 14   BUN 8 8   CREATININE 0.74 0.67   EGFR >90 >90   GLUCOSE 99 102*     Recent Labs     05/22/24  1058 05/22/24  0618 05/21/24  0748   ALBUMIN 3.7 3.5 3.7   ALKPHOS 73  --  72   ALT 10  --  7   AST 9  --  10   BILITOT 0.4  --  0.4     Calcium/Phos:   Lab Results   Component Value Date    CALCIUM 8.8 05/22/2024    PHOS 3.8 05/22/2024      COAG: No results for input(s): \"INR\", \"DDIMERVTE\" in the last 20719 hours.  CRP: No results found for: \"CRP\"     ENDO:  Recent Labs     05/21/24  0900   TSH 4.66*  4.66*      CARDIAC:   Recent Labs     05/21/24  0905 05/21/24  0748   TROPHS <3 <3   BNP  --  29   No results for input(s): \"CHOL\", \"LDLF\", \"LDL\", \"LDLCALC\", \"HDL\", \"TRIG\" in the last 04840 hours.  No data recorded    Micro/ID:   No results found for the last 90 days.                   No lab exists for component: \"AGALPCRNB\"   .ID  Lab Results   Component Value Date    URINECULTURE MIXED URETHRAL ZAKI. 04/14/2023    BLOODCULT Loaded on Instrument - Culture in progress 05/21/2024    BLOODCULT Loaded on Instrument - Culture in progress 05/21/2024       Images:  Transthoracic Echo (TTE) [facility], [address] [place] [address]                Tel [phone] and Fax [phone]    TRANSTHORACIC ECHOCARDIOGRAM REPORT       Patient Name:      TEODORA NUR KYE Garcia Physician:    [id number] Zainab Eid MD  Study Date:        5/22/2024            Ordering Provider:    [id number] DESTINEE LOUIS  MRN/PID:           [id number]             Fellow:  Accession#:        [id number]         Nurse:  Date of Birth/Age: 1980 / 44 years Sonographer:          Kaylin Acosta RDCS  Gender:            F                    Additional Staff:  Height:            170.18 cm            " Admit Date:  Weight:            116.12 kg            Admission Status:     Inpatient -                                                                Routine  BSA / BMI:         2.25 m2 / 40.09      Encounter#:           [id number]                     kg/m2                                          Department Location:  [facility] Non                                                                Invasive  Blood Pressure: 123 /85 mmHg    Study Type:    TRANSTHORACIC ECHO (TTE) COMPLETE  Diagnosis/ICD: Bradycardia, unspecified-R00.1  Indication:    Bradycardia  CPT Code:      Echo Complete w Full Doppler-87721    Patient History:  Pertinent History: Bradycardia, unresponsive for 2 minutes.    Study Detail: The following Echo studies were performed: 2D, M-Mode, Doppler and                color flow. The patient was awake.       PHYSICIAN INTERPRETATION:  Left Ventricle: The left ventricular systolic function is normal, with an estimated ejection fraction of 65-70%. There are no regional wall motion abnormalities. The left ventricular cavity size is normal. The left ventricular septal wall thickness is mildly increased. There is mildly increased left ventricular posterior wall thickness. Spectral Doppler shows a normal pattern of left ventricular diastolic filling.  Left Atrium: The left atrium is normal in size.  Right Ventricle: The right ventricle is normal in size. There is normal right ventricular global systolic function.  Right Atrium: The right atrium is normal in size.  Aortic Valve: The aortic valve is trileaflet. There is no evidence of aortic valve stenosis.  The aortic valve dimensionless index is 0.91. There is no evidence of aortic valve regurgitation. The peak instantaneous gradient of the aortic valve is 9.6 mmHg. The mean gradient of the aortic valve is 5.7 mmHg.  Mitral Valve: The mitral valve is normal in structure. There is trace mitral valve regurgitation.  Tricuspid Valve: The tricuspid valve is  structurally normal. There is trace tricuspid regurgitation. The right ventricular systolic pressure is unable to be estimated.  Pulmonic Valve: The pulmonic valve is structurally normal. There is mild pulmonic valve regurgitation.  Pericardium: There is no pericardial effusion noted.  Aorta: The aortic root is normal.  Systemic Veins: The inferior vena cava appears mildly dilated. There is IVC inspiratory collapse greater than 50%.  In comparison to the previous echocardiogram(s): There are no prior studies on this patient for comparison purposes.       CONCLUSIONS:   1. Left ventricular systolic function is normal with a 65-70% estimated ejection fraction.    QUANTITATIVE DATA SUMMARY:  2D MEASUREMENTS:                           Normal Ranges:  IVSd:          0.95 cm   (0.6-1.1cm)  LVPWd:         1.07 cm   (0.6-1.1cm)  LVIDd:         4.64 cm   (3.9-5.9cm)  LVIDs:         2.90 cm  LV Mass Index: 72.7 g/m2  LV % FS        37.6 %    LA VOLUME:                                Normal Ranges:  LA Vol A4C:        58.8 ml    (22+/-6mL/m2)  LA Vol A2C:        45.4 ml  LA Vol BP:         52.1 ml  LA Vol Index A4C:  26.2ml/m2  LA Vol Index A2C:  20.2 ml/m2  LA Vol Index BP:   23.2 ml/m2  LA Area A4C:       19.5 cm2  LA Area A2C:       17.3 cm2  LA Major Axis A4C: 5.5 cm  LA Major Axis A2C: 5.6 cm  LA Volume Index:   23.2 ml/m2  LA Vol A4C:        56.5 ml  LA Vol A2C:        44.1 ml    RA VOLUME BY A/L METHOD:                        Normal Ranges:  RA Area A4C: 15.3 cm2    M-MODE MEASUREMENTS:                   Normal Ranges:  Ao Root: 3.30 cm (2.0-3.7cm)  AoV Exc: 2.21 cm (1.5-2.5cm)  LAs:     4.09 cm (2.7-4.0cm)    AORTA MEASUREMENTS:                       Normal Ranges:  AoV Exc:     2.21 cm (1.5-2.5cm)  Ao Sinus, d: 3.10 cm (2.1-3.5cm)  Asc Ao, d:   3.40 cm (2.1-3.4cm)    LV SYSTOLIC FUNCTION BY 2D PLANIMETRY (MOD):                      Normal Ranges:  EF-A4C View: 68.8 % (>=55%)  EF-A2C View: 64.6 %  EF-Biplane:  67.2  %    LV DIASTOLIC FUNCTION:                                Normal Ranges:  MV Peak E:        0.78 m/s    (0.7-1.2 m/s)  MV Peak A:        0.59 m/s    (0.42-0.7 m/s)  E/A Ratio:        1.32        (1.0-2.2)  MV e'             0.11 m/s    (>8.0)  MV lateral e'     0.11 m/s  MV medial e'      0.09 m/s  MV A Dur:         155.71 msec  E/e' Ratio:       7.06        (<8.0)  PulmV Sys Eliecer:    69.95 cm/s  PulmV Sánchez Eliecer:   49.25 cm/s  PulmV S/D Eliecer:    1.42  PulmV A Revs Eliecer: 27.75 cm/s  PulmV A Revs Dur: 117.65 msec    MITRAL VALVE:                  Normal Ranges:  MV DT: 235 msec (150-240msec)    AORTIC VALVE:                                    Normal Ranges:  AoV Vmax:                1.55 m/s (<=1.7m/s)  AoV Peak P.6 mmHg (<20mmHg)  AoV Mean P.7 mmHg (1.7-11.5mmHg)  LVOT Max Eliecer:            1.41 m/s (<=1.1m/s)  AoV VTI:                 33.43 cm (18-25cm)  LVOT VTI:                30.30 cm  LVOT Diameter:           2.00 cm  (1.8-2.4cm)  AoV Area, VTI:           2.86 cm2 (2.5-5.5cm2)  AoV Area,Vmax:           2.87 cm2 (2.5-4.5cm2)  AoV Dimensionless Index: 0.91       RIGHT VENTRICLE:  RV Basal 3.20 cm  RV Mid   2.60 cm  RV Major 7.2 cm  TAPSE:   27.0 mm  RV s'    0.17 m/s    TRICUSPID VALVE/RVSP:                              Normal Ranges:  Peak TR Velocity: 2.61 m/s  RV Syst Pressure: 30.2 mmHg (< 30mmHg)  IVC Diam:         2.22 cm    PULMONIC VALVE:                       Normal Ranges:  PV Max Eliecer: 1.0 m/s  (0.6-0.9m/s)  PV Max PG:  3.9 mmHg    Pulmonary Veins:  PulmV A Revs Dur: 117.65 msec  PulmV A Revs Eliecer: 27.75 cm/s  PulmV Sánchez Eliecer:   49.25 cm/s  PulmV S/D Eliecer:    1.42  PulmV Sys Eliecer:    69.95 cm/s    AORTA:  Asc Ao Diam 3.39 cm       [id number] Zainab Eid MD  Electronically signed on 2024 at 3:08:24 PM       ** Final **       Medications:  Scheduled medications  divalproex, 1,000 mg, oral, Daily  enoxaparin, 40 mg, subcutaneous, q12h DAVID  nicotine, 1 patch, transdermal,  Daily      Continuous medications     PRN medications  PRN medications: acetaminophen     Assessment and Plan:  Rhonda Carrasco is a 44 y.o. 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 Transferred to [facility] for further management.      Acute Medical Issues      # Breakthrough Seizure   # Hx Epilepsy, TBI s/p craniotomy   - Likely 2/2 medication non-compliance   - Last known normal on 5/20 prior to 9pm  - CT head wo con with no acute abnormality   - CT angio head and neck w/ and wo con unremarkable  - MRI brain wo con unremarkable despite postoperative changes from craniotomy   - Ethanol level and UDS negative   - Depakote level subtherapeutic < 10   - Neurology consulted, appreciate recs   - Load with Depakote 1000mg IV   - Start Depakote 1000mg daily   - Depakote level in AM  - Seizure precautions      # Bradycardia (resolved)  - EKG in ED showed sinus bradycardia, HR 52 bpm   - TSH elevated 4.66, free T4 normal   - ECHO done 5/22 showed EF 65-70%    - On telemetry      Chronic Medical Issues      # Tobacco Abuse   - Nicotine patch ordered   - Encourage cessation      Fluids: PO intake & IVF PRN   Electrolytes: replete as needed  Nutrition: Regular diet   GI PPX: None   DVT PPX: Lovenox     Access: PIV  Antibiotics: None  Oxygenation: RA    Dispo: Neuro following for breakthrough seizure. Anticipate discharge tomorrow if Depakote levels therapeutic.     Sandhya Whitney, DO  PGY-2, Internal Medicine

## 2024-05-22 NOTE — NURSING NOTE
Patient left Against medical advice. Spoke to patient with Dr. Whitney.Patient explained risk of leaving could result in another seizure, possible intubation and death. Patient verbally explained she understood risks. IV and tele removed by patient.

## 2024-05-22 NOTE — CONSULTS
"Consults    C C : Witnessed Sz - 1 minute    History Of Present Illness  Rhonda Carrasco is a 44 y.o. female presenting with PMH of right hemicraniotomy and was brought in by her boy friend that she slumped out of chair and seized for about a minutes followed by confusion. Depakote level was subtherapeutic at admission. She has been back to normal since admission.     She has not been taking AEDs since last pregnancy - more than an year ago.        Past Medical and Surgical History    Past Medical History:   Diagnosis Date    Seizures (Multi)           No past surgical history on file.     Social History  Social History     Tobacco Use    Smoking status: Every Day     Current packs/day: 0.50     Types: Cigarettes    Smokeless tobacco: Never   Vaping Use    Vaping status: Every Day   Substance Use Topics    Alcohol use: Not Currently    Drug use: Never     Allergies  Dilantin [phenytoin sodium extended]  Medications Prior to Admission   Medication Sig Dispense Refill Last Dose    divalproex (Depakote ER) 500 mg 24 hr tablet Take 1 tablet (500 mg) by mouth once daily. Do not crush, chew, or split.          Review of Systems      Cardiovascular system: S1-S2 intact  Respiratory clear to auscultation bilateral on deep breathing he feels irritability on the left side of the chest.    Neurological Exam      Last Recorded Vitals  Blood pressure 123/85, pulse 66, temperature 36.7 °C (98.1 °F), resp. rate 16, height 1.702 m (5' 7\"), weight 116 kg (256 lb 9.6 oz), last menstrual period 03/01/2024, SpO2 97%.    Relevant Results      Current Facility-Administered Medications:     acetaminophen (Tylenol) tablet 650 mg, 650 mg, oral, q6h PRN, Sandhya Whitney DO, 650 mg at 05/21/24 1716    divalproex (Depakote ER) 24 hr tablet 500 mg, 500 mg, oral, Daily, Sandhya Whitney DO, 500 mg at 05/22/24 0845    enoxaparin (Lovenox) syringe 40 mg, 40 mg, subcutaneous, q12h DAVID, Sandhya Whitney, DO, 40 mg at 05/22/24 0845    nicotine (Nicoderm CQ) " 14 mg/24 hr patch 1 patch, 1 patch, transdermal, Daily, Sandhya Whitney, DO, 1 patch at 05/22/24 0846     Continuous medications    PRN medications, reviewed                [place] Coma Scale  Best Eye Response: Spontaneous  Best Verbal Response: Oriented  Best Motor Response: Follows commands  [place] Coma Scale Score: 15                       I have personally reviewed the following imaging for brain (CT/ MR) and results and discussed in detail with the patient/care giver/family : CT head (motion artifacts)      Assessment/Plan   Admitted with witnessed seizure  Risk factor: PMH of right craniotomy  Depakote level was subtherapeutic at admission    Advised: Depakote 1000 gram IVPB , now and then 500 mg BID  To get another Depakote level in AM    In future if become pregnant or plans pregnancy then consider Lamotrigine as it is safe to use pregnancy    Patient/Family Education: Extensive time was spent educating the patient on relevant anatomy, clinical findings and imaging, as well as discussing the potential diagnoses as discussed above.  Pharmacology: as above. Exercise: I discussed the importance of maintaining a daily exercise program, including stretching and strengthening. Preventative strategies were reviewed, specifically avoidance of any exercises that exacerbate pain.Return to online virtual visit/ clinic visit for follow-up with Dr. Duarte or Dr. Lopez in 4 weeks or sooner as needed.The patient expressed understanding and agreement with the assessment and plan.  Patient encouraged to contact us should they have any questions, concerns, or any changes in symptoms. Thank you for allowing me to participate in the care of your patient.** This note is created using speech recognition transcription software. Despite proofreading, several typographical errors might be present that might affect the meaning of the content. Please call with any questions.**          Bimal Russ MD

## 2024-05-23 LAB
SCAN RESULT: ABNORMAL
VALPROATE FREE SERPL-MCNC: <1.3 UG/ML (ref 4–30)

## 2024-05-23 NOTE — DISCHARGE SUMMARY
Discharge Diagnosis  Breakthrough seizure   Hx epilepsy   Medication Non-compliance     Issues Requiring Follow-Up  Epilepsy, breakthrough seizure   Patient left AMA     Discharge Meds     Your medication list        ASK your doctor about these medications        Instructions Last Dose Given Next Dose Due   divalproex 500 mg 24 hr tablet  Commonly known as: Depakote ER                    Test Results Pending At Discharge  Pending Labs       No current pending labs.            Hospital Course  Rhonda Carrasco is a 44 y.o. 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 Transferred to [facility] for further management. In the ED, vitals were as follows: T 97.1 F, HR 62, RR 16, /92, SpO2 100% on RA. CBC and CMP relatively unremarkable, D-dimer mildly elevated at 359, UDS and UA negative. Chest X-ray showed mild hypoinflated lungs. CT head wo con showed no acute intracranial process, encephalomalacia in right cerebral hemisphere similar to prior study. CT angio head/neck w/ and wo con showed slight tortuosity and ectasia of distal internal carotid arteries bilaterally, likely normal variation, otherwise unremarkable. MRI brain wo con showed no evidence of mass, infarction or hemorrhage. Postoperative changes associated with craniotomy. EKG showed sinus bradycardia, rate 52 bpm, no ST/T change. She was transferred to [facility] and admitted for breakthrough seizure due to medication non-compliance.     Depakote level was subtherapeutic and Neurology was consulted who recommended loading with 1,000mg IV and starting Depakote ER 1,000mg daily. Echo was ordered and done on 5/22 which showed EF 65-70%. Repeat depakote level ordered for 5/23 AM but notified by RN that patient demanding to leave against medical advice.     Patient educated on risks of leaving AMA which included recurrent seizure, need for intubation, or  possible death. Her and her fiance expressed understanding but still requesting to leave. Script sent for Depakote 1,000mg daily and follow up to be scheduled with Neurology. Patient signed paperwork and discharged AMA.      Pertinent Physical Exam At Time of Discharge  Physical Exam  Constitutional:       Appearance: Normal appearance.   Cardiovascular:      Rate and Rhythm: Normal rate and regular rhythm.      Heart sounds: Normal heart sounds. No murmur heard.     No friction rub. No gallop.   Pulmonary:      Effort: Pulmonary effort is normal.      Breath sounds: Normal breath sounds. No wheezing or rhonchi.   Abdominal:      General: Bowel sounds are normal. There is no distension.      Palpations: Abdomen is soft.      Tenderness: There is no abdominal tenderness.   Musculoskeletal:         General: No swelling.   Skin:     General: Skin is warm and dry.      Findings: No bruising or rash.   Neurological:      General: No focal deficit present.      Mental Status: She is alert and oriented to person, place, and time.   Psychiatric:         Mood and Affect: Mood normal.         Thought Content: Thought content normal.         Judgment: Judgment normal.        Outpatient Follow-Up  No future appointments.      Sandhya Whitney DO

## 2024-05-25 LAB
ATRIAL RATE: 52 BPM
ATRIAL RATE: 64 BPM
BACTERIA BLD CULT: NORMAL
BACTERIA BLD CULT: NORMAL
P AXIS: 19 DEGREES
P AXIS: 35 DEGREES
P OFFSET: 192 MS
P OFFSET: 198 MS
P ONSET: 146 MS
P ONSET: 149 MS
PR INTERVAL: 138 MS
PR INTERVAL: 138 MS
Q ONSET: 215 MS
Q ONSET: 218 MS
QRS COUNT: 10 BEATS
QRS COUNT: 9 BEATS
QRS DURATION: 82 MS
QRS DURATION: 86 MS
QT INTERVAL: 402 MS
QT INTERVAL: 438 MS
QTC CALCULATION(BAZETT): 407 MS
QTC CALCULATION(BAZETT): 414 MS
QTC FREDERICIA: 410 MS
QTC FREDERICIA: 417 MS
R AXIS: -1 DEGREES
R AXIS: -8 DEGREES
T AXIS: 12 DEGREES
T AXIS: 8 DEGREES
T OFFSET: 416 MS
T OFFSET: 437 MS
VENTRICULAR RATE: 52 BPM
VENTRICULAR RATE: 64 BPM

## 2024-06-12 ENCOUNTER — APPOINTMENT (OUTPATIENT)
Dept: OBSTETRICS AND GYNECOLOGY | Facility: CLINIC | Age: 44
End: 2024-06-12
Payer: COMMERCIAL

## 2024-06-25 ENCOUNTER — APPOINTMENT (OUTPATIENT)
Dept: OBSTETRICS AND GYNECOLOGY | Facility: CLINIC | Age: 44
End: 2024-06-25
Payer: COMMERCIAL

## 2024-07-17 ENCOUNTER — APPOINTMENT (OUTPATIENT)
Dept: OBSTETRICS AND GYNECOLOGY | Facility: CLINIC | Age: 44
End: 2024-07-17
Payer: COMMERCIAL

## 2024-07-30 ENCOUNTER — APPOINTMENT (OUTPATIENT)
Dept: OBSTETRICS AND GYNECOLOGY | Facility: CLINIC | Age: 44
End: 2024-07-30
Payer: COMMERCIAL

## 2024-07-30 VITALS
HEIGHT: 67 IN | BODY MASS INDEX: 39.24 KG/M2 | WEIGHT: 250 LBS | SYSTOLIC BLOOD PRESSURE: 146 MMHG | DIASTOLIC BLOOD PRESSURE: 86 MMHG

## 2024-07-30 DIAGNOSIS — Z30.432 ENCOUNTER FOR IUD REMOVAL: Primary | ICD-10-CM

## 2024-07-30 PROCEDURE — 58301 REMOVE INTRAUTERINE DEVICE: CPT | Performed by: OBSTETRICS & GYNECOLOGY

## 2024-07-30 NOTE — PROGRESS NOTES
Patient ID: Rhonda Carrasco is a 44 y.o. female.    IUD Removal    Performed by: Shai Ayon MD  Authorized by: Shai Ayon MD    Procedure: IUD removal    Consent obtained by patient, parent, or legal power of  - including discussion of procedure risks and benefits, patient questions answered, and patient education provided: yes    Reason for removal: patient request    Tenaculum applied to cervix: no    IUD grasped by forceps: yes    IUD removed: yes    Date/Time of Removal:  7/30/2024 2:36 PM  Removed without complications: yes    IUD intact: yes    Cervix manually dilated: no    IUD removed for the purpose of conceiving

## 2024-08-05 ENCOUNTER — HOSPITAL ENCOUNTER (EMERGENCY)
Facility: HOSPITAL | Age: 44
Discharge: HOME | End: 2024-08-05
Payer: COMMERCIAL

## 2024-08-05 VITALS
HEART RATE: 84 BPM | SYSTOLIC BLOOD PRESSURE: 137 MMHG | WEIGHT: 250 LBS | TEMPERATURE: 97.6 F | DIASTOLIC BLOOD PRESSURE: 90 MMHG | OXYGEN SATURATION: 98 % | HEIGHT: 67 IN | RESPIRATION RATE: 16 BRPM | BODY MASS INDEX: 39.24 KG/M2

## 2024-08-05 DIAGNOSIS — Z32.02 PREGNANCY TEST NEGATIVE: Primary | ICD-10-CM

## 2024-08-05 LAB
AMORPH CRY #/AREA UR COMP ASSIST: ABNORMAL /HPF
APPEARANCE UR: ABNORMAL
BACTERIA #/AREA URNS AUTO: ABNORMAL /HPF
BILIRUB UR STRIP.AUTO-MCNC: NEGATIVE MG/DL
COLOR UR: COLORLESS
GLUCOSE UR STRIP.AUTO-MCNC: NORMAL MG/DL
HCG UR QL IA.RAPID: NEGATIVE
KETONES UR STRIP.AUTO-MCNC: NEGATIVE MG/DL
LEUKOCYTE ESTERASE UR QL STRIP.AUTO: ABNORMAL
NITRITE UR QL STRIP.AUTO: NEGATIVE
PH UR STRIP.AUTO: 6.5 [PH]
PROT UR STRIP.AUTO-MCNC: NEGATIVE MG/DL
RBC # UR STRIP.AUTO: NEGATIVE /UL
RBC #/AREA URNS AUTO: ABNORMAL /HPF
SP GR UR STRIP.AUTO: 1
SQUAMOUS #/AREA URNS AUTO: ABNORMAL /HPF
UROBILINOGEN UR STRIP.AUTO-MCNC: NORMAL MG/DL
WBC #/AREA URNS AUTO: ABNORMAL /HPF

## 2024-08-05 PROCEDURE — 81025 URINE PREGNANCY TEST: CPT

## 2024-08-05 PROCEDURE — 87086 URINE CULTURE/COLONY COUNT: CPT | Mod: GENLAB

## 2024-08-05 PROCEDURE — 99283 EMERGENCY DEPT VISIT LOW MDM: CPT

## 2024-08-05 PROCEDURE — 81001 URINALYSIS AUTO W/SCOPE: CPT

## 2024-08-05 ASSESSMENT — PAIN SCALES - GENERAL: PAINLEVEL_OUTOF10: 0 - NO PAIN

## 2024-08-05 ASSESSMENT — PAIN - FUNCTIONAL ASSESSMENT: PAIN_FUNCTIONAL_ASSESSMENT: 0-10

## 2024-08-05 ASSESSMENT — COLUMBIA-SUICIDE SEVERITY RATING SCALE - C-SSRS
6. HAVE YOU EVER DONE ANYTHING, STARTED TO DO ANYTHING, OR PREPARED TO DO ANYTHING TO END YOUR LIFE?: NO
2. HAVE YOU ACTUALLY HAD ANY THOUGHTS OF KILLING YOURSELF?: NO
1. IN THE PAST MONTH, HAVE YOU WISHED YOU WERE DEAD OR WISHED YOU COULD GO TO SLEEP AND NOT WAKE UP?: NO

## 2024-08-05 NOTE — ED PROVIDER NOTES
HPI   Chief Complaint   Patient presents with    Possible Pregnancy     Would like a pregnancy test because she is a few days late on her period        Patient is a 44-year-old female with significant PMH of epilepsy and TBI presents to the ED with cc of wanting a pregnancy test.  Patient states she is a week late on her menstrual cycle.  Patient states normally she is regular.  Patient does not use contraception.  Patient has had 1 pregnancy in the past.  Patient denies any vaginal bleeding.  Patient states she is urinating more frequently denies any abdominal pain dysuria nausea vomiting chest pain shortness of breath.  Patient vapes denies any alcohol or street drug abuse.              Patient History   Past Medical History:   Diagnosis Date    Seizures (Multi)      No past surgical history on file.  No family history on file.  Social History     Tobacco Use    Smoking status: Former     Current packs/day: 0.50     Types: Cigarettes    Smokeless tobacco: Current   Vaping Use    Vaping status: Every Day   Substance Use Topics    Alcohol use: Not Currently    Drug use: Never       Physical Exam   ED Triage Vitals [08/05/24 1443]   Temperature Heart Rate Respirations BP   36.4 °C (97.6 °F) 84 16 137/90      Pulse Ox Temp src Heart Rate Source Patient Position   98 % -- Monitor Sitting      BP Location FiO2 (%)     Left arm --       Physical Exam  HENT:      Head: Normocephalic.   Cardiovascular:      Rate and Rhythm: Normal rate and regular rhythm.      Pulses: Normal pulses.   Pulmonary:      Effort: Pulmonary effort is normal.      Breath sounds: No wheezing, rhonchi or rales.   Abdominal:      Palpations: Abdomen is soft.      Tenderness: There is no abdominal tenderness. There is no guarding or rebound.   Neurological:      General: No focal deficit present.      Mental Status: She is alert and oriented to person, place, and time. Mental status is at baseline.           ED Course & MDM   Diagnoses as of 08/05/24  1545   Pregnancy test negative                       No data recorded                      Medical Decision Making  Medical Decision Making:  Patient presented as described in HPI. Patient case including ROS, PE, and treatment and plan discussed with ED attending if attached as cosigner. Due to patients presentation orders completed include as documented.  Patient presents to the ED with cc of wanting a pregnancy test.  Patient states she is around a week late on her period.  Patient does have an OB/GYN Dr. Ayon that she follows with.  Patient is nontoxic-appearing abdomen is soft nontender lung sounds are clear.  Pending UA and hCG.  UA and hCG are negative.  Patient is Latosha's findings that can follow-up with OB/GYN educated on any of her symptoms to return remained stable and discharged  Patient was advised to follow up with PCP or recommended provider in 2-3 days for another evaluation and exam. I advised patient/guardian to return or go to closest emergency room immediately if symptoms change, get worse, new symptoms develop prior to follow up. If there is no improvement in symptoms in the next 24 hours they are advised to return for further evaluation and exam. I also explained the plan and treatment course. Patient/guardian is in agreement with plan, treatment course, and follow up and states verbally that they will comply.      Patient care discussed with: N/A  Social Determinants affecting care: N/A    Final diagnosis and disposition as below.  See CI    Homegoing. I discussed the differential; results and discharge plan with the patient and/or family/friend/caregiver if present.  I emphasized the importance of follow-up with the physician I referred them to in the timeframe recommended.  I explained reasons for the patient to return to the Emergency Department. They agreed that if they feel their condition is worsening or if they have any other concern they should call 911 immediately for further  assistance. I gave the patient an opportunity to ask all questions they had and answered all of them accordingly. They understand return precautions and discharge instructions. The patient and/or family/friend/caregiver expressed understanding verbally and that they would comply.       Disposition:  Discharge      This note has been transcribed using voice recognition and may contain grammatical errors, misplaced words, incorrect words, incorrect phrases or other errors.        Labs Reviewed   URINALYSIS WITH REFLEX CULTURE AND MICROSCOPIC - Abnormal       Result Value    Color, Urine Colorless (*)     Appearance, Urine Turbid (*)     Specific Gravity, Urine 1.001 (*)     pH, Urine 6.5      Protein, Urine NEGATIVE      Glucose, Urine Normal      Blood, Urine NEGATIVE      Ketones, Urine NEGATIVE      Bilirubin, Urine NEGATIVE      Urobilinogen, Urine Normal      Nitrite, Urine NEGATIVE      Leukocyte Esterase, Urine 25 Dayanara/µL (*)    MICROSCOPIC ONLY, URINE - Abnormal    WBC, Urine 1-5      RBC, Urine 3-5      Squamous Epithelial Cells, Urine 10-25 (FEW)      Bacteria, Urine 1+ (*)     Amorphous Crystals, Urine 1+     HCG, URINE, QUALITATIVE - Normal    HCG, Urine NEGATIVE     URINE CULTURE   URINALYSIS WITH REFLEX CULTURE AND MICROSCOPIC    Narrative:     The following orders were created for panel order Urinalysis with Reflex Culture and Microscopic.  Procedure                               Abnormality         Status                     ---------                               -----------         ------                     Urinalysis with Reflex C...[[id number]]  Abnormal            Final result               Extra Urine Gray Tube[[id number]]                            In process                   Please view results for these tests on the individual orders.   EXTRA URINE GRAY TUBE      Procedure  Procedures     Kerri Kearns PA-C  08/05/24 1541

## 2024-08-06 LAB — HOLD SPECIMEN: NORMAL

## 2024-08-07 LAB — BACTERIA UR CULT: NORMAL

## 2024-09-18 ENCOUNTER — APPOINTMENT (OUTPATIENT)
Dept: OBSTETRICS AND GYNECOLOGY | Facility: CLINIC | Age: 44
End: 2024-09-18
Payer: COMMERCIAL

## 2024-09-18 VITALS
SYSTOLIC BLOOD PRESSURE: 112 MMHG | HEIGHT: 67 IN | WEIGHT: 261 LBS | DIASTOLIC BLOOD PRESSURE: 78 MMHG | BODY MASS INDEX: 40.97 KG/M2

## 2024-09-18 DIAGNOSIS — N92.6 IRREGULAR MENSTRUAL CYCLE: Primary | ICD-10-CM

## 2024-09-18 PROCEDURE — 3074F SYST BP LT 130 MM HG: CPT | Performed by: OBSTETRICS & GYNECOLOGY

## 2024-09-18 PROCEDURE — 99213 OFFICE O/P EST LOW 20 MIN: CPT | Performed by: OBSTETRICS & GYNECOLOGY

## 2024-09-18 PROCEDURE — 3078F DIAST BP <80 MM HG: CPT | Performed by: OBSTETRICS & GYNECOLOGY

## 2024-09-18 PROCEDURE — 3008F BODY MASS INDEX DOCD: CPT | Performed by: OBSTETRICS & GYNECOLOGY

## 2024-09-18 NOTE — PROGRESS NOTES
Subjective   Patient ID: Rhonda Carrasco is a 44 y.o. female who presents for Confirm pregnancy.  Established patient who had a period last month.  She is uncertain of the date but she thinks she is a week or 2 late.  We did a urine pregnancy test that was negative.  I would like her to wait till mid October.  If she does not have a cycle by then I will obtain blood work.    She is trying to conceive.  She is on Depakote        Review of Systems    Objective   Physical Exam  Constitutional:       Appearance: Normal appearance. She is obese.   Neurological:      Mental Status: She is alert.         Assessment/Plan   Follow-up in 1 month.  If no menses we will obtain blood work.  Urine pregnancy test negative         Shai Ayon MD 09/18/24 1:55 PM

## 2024-10-29 ENCOUNTER — LAB (OUTPATIENT)
Dept: LAB | Facility: LAB | Age: 44
End: 2024-10-29
Payer: COMMERCIAL

## 2024-10-29 ENCOUNTER — APPOINTMENT (OUTPATIENT)
Dept: OBSTETRICS AND GYNECOLOGY | Facility: CLINIC | Age: 44
End: 2024-10-29
Payer: COMMERCIAL

## 2024-10-29 VITALS
SYSTOLIC BLOOD PRESSURE: 126 MMHG | DIASTOLIC BLOOD PRESSURE: 80 MMHG | HEIGHT: 67 IN | BODY MASS INDEX: 41.59 KG/M2 | WEIGHT: 265 LBS

## 2024-10-29 DIAGNOSIS — N92.6 IRREGULAR MENSES: ICD-10-CM

## 2024-10-29 DIAGNOSIS — N92.6 IRREGULAR MENSTRUAL CYCLE: ICD-10-CM

## 2024-10-29 DIAGNOSIS — N92.6 IRREGULAR MENSTRUAL CYCLE: Primary | ICD-10-CM

## 2024-10-29 LAB — PREGNANCY TEST URINE, POC: NEGATIVE

## 2024-10-29 PROCEDURE — 3008F BODY MASS INDEX DOCD: CPT | Performed by: OBSTETRICS & GYNECOLOGY

## 2024-10-29 PROCEDURE — 36415 COLL VENOUS BLD VENIPUNCTURE: CPT

## 2024-10-29 PROCEDURE — 81025 URINE PREGNANCY TEST: CPT | Performed by: OBSTETRICS & GYNECOLOGY

## 2024-10-29 PROCEDURE — 3079F DIAST BP 80-89 MM HG: CPT | Performed by: OBSTETRICS & GYNECOLOGY

## 2024-10-29 PROCEDURE — 3074F SYST BP LT 130 MM HG: CPT | Performed by: OBSTETRICS & GYNECOLOGY

## 2024-10-29 PROCEDURE — 99213 OFFICE O/P EST LOW 20 MIN: CPT | Performed by: OBSTETRICS & GYNECOLOGY

## 2024-10-29 PROCEDURE — 83001 ASSAY OF GONADOTROPIN (FSH): CPT

## 2024-10-29 PROCEDURE — 83002 ASSAY OF GONADOTROPIN (LH): CPT

## 2024-10-29 PROCEDURE — 84443 ASSAY THYROID STIM HORMONE: CPT

## 2024-10-29 PROCEDURE — 84702 CHORIONIC GONADOTROPIN TEST: CPT

## 2024-10-30 LAB
B-HCG SERPL-ACNC: <3 MIU/ML
FSH SERPL-ACNC: 7 IU/L
LH SERPL-ACNC: 4.7 IU/L
TSH SERPL-ACNC: 4.74 MIU/L (ref 0.44–3.98)

## 2024-10-31 DIAGNOSIS — R79.89 ABNORMAL THYROID BLOOD TEST: ICD-10-CM

## 2024-11-21 ENCOUNTER — APPOINTMENT (OUTPATIENT)
Dept: CARDIOLOGY | Facility: HOSPITAL | Age: 44
End: 2024-11-21
Payer: COMMERCIAL

## 2024-11-21 ENCOUNTER — HOSPITAL ENCOUNTER (EMERGENCY)
Facility: HOSPITAL | Age: 44
Discharge: HOME | End: 2024-11-21
Attending: STUDENT IN AN ORGANIZED HEALTH CARE EDUCATION/TRAINING PROGRAM
Payer: COMMERCIAL

## 2024-11-21 ENCOUNTER — APPOINTMENT (OUTPATIENT)
Dept: RADIOLOGY | Facility: HOSPITAL | Age: 44
End: 2024-11-21
Payer: COMMERCIAL

## 2024-11-21 VITALS
HEART RATE: 63 BPM | RESPIRATION RATE: 16 BRPM | OXYGEN SATURATION: 96 % | SYSTOLIC BLOOD PRESSURE: 122 MMHG | BODY MASS INDEX: 47.57 KG/M2 | TEMPERATURE: 98 F | WEIGHT: 278.66 LBS | HEIGHT: 64 IN | DIASTOLIC BLOOD PRESSURE: 99 MMHG

## 2024-11-21 DIAGNOSIS — R07.9 CHEST PAIN, UNSPECIFIED TYPE: Primary | ICD-10-CM

## 2024-11-21 LAB
ALBUMIN SERPL BCP-MCNC: 4 G/DL (ref 3.4–5)
ALP SERPL-CCNC: 92 U/L (ref 33–110)
ALT SERPL W P-5'-P-CCNC: 18 U/L (ref 7–45)
ANION GAP SERPL CALC-SCNC: 11 MMOL/L (ref 10–20)
APPEARANCE UR: CLEAR
AST SERPL W P-5'-P-CCNC: 12 U/L (ref 9–39)
BASOPHILS # BLD AUTO: 0.03 X10*3/UL (ref 0–0.1)
BASOPHILS NFR BLD AUTO: 0.3 %
BILIRUB SERPL-MCNC: 0.5 MG/DL (ref 0–1.2)
BILIRUB UR STRIP.AUTO-MCNC: NEGATIVE MG/DL
BNP SERPL-MCNC: 19 PG/ML (ref 0–99)
BUN SERPL-MCNC: 12 MG/DL (ref 6–23)
CALCIUM SERPL-MCNC: 9.2 MG/DL (ref 8.6–10.3)
CARDIAC TROPONIN I PNL SERPL HS: 3 NG/L (ref 0–13)
CARDIAC TROPONIN I PNL SERPL HS: <3 NG/L (ref 0–13)
CHLORIDE SERPL-SCNC: 104 MMOL/L (ref 98–107)
CO2 SERPL-SCNC: 27 MMOL/L (ref 21–32)
COLOR UR: COLORLESS
CREAT SERPL-MCNC: 0.76 MG/DL (ref 0.5–1.05)
EGFRCR SERPLBLD CKD-EPI 2021: >90 ML/MIN/1.73M*2
EOSINOPHIL # BLD AUTO: 0.33 X10*3/UL (ref 0–0.7)
EOSINOPHIL NFR BLD AUTO: 3.1 %
ERYTHROCYTE [DISTWIDTH] IN BLOOD BY AUTOMATED COUNT: 12.5 % (ref 11.5–14.5)
FLUAV RNA RESP QL NAA+PROBE: NOT DETECTED
FLUBV RNA RESP QL NAA+PROBE: NOT DETECTED
GLUCOSE SERPL-MCNC: 111 MG/DL (ref 74–99)
GLUCOSE UR STRIP.AUTO-MCNC: NORMAL MG/DL
HCT VFR BLD AUTO: 45.7 % (ref 36–46)
HGB BLD-MCNC: 15.3 G/DL (ref 12–16)
IMM GRANULOCYTES # BLD AUTO: 0.02 X10*3/UL (ref 0–0.7)
IMM GRANULOCYTES NFR BLD AUTO: 0.2 % (ref 0–0.9)
KETONES UR STRIP.AUTO-MCNC: NEGATIVE MG/DL
LACTATE SERPL-SCNC: 0.8 MMOL/L (ref 0.4–2)
LEUKOCYTE ESTERASE UR QL STRIP.AUTO: NEGATIVE
LIPASE SERPL-CCNC: 22 U/L (ref 9–82)
LYMPHOCYTES # BLD AUTO: 3.5 X10*3/UL (ref 1.2–4.8)
LYMPHOCYTES NFR BLD AUTO: 33.2 %
MAGNESIUM SERPL-MCNC: 2.07 MG/DL (ref 1.6–2.4)
MCH RBC QN AUTO: 32.8 PG (ref 26–34)
MCHC RBC AUTO-ENTMCNC: 33.5 G/DL (ref 32–36)
MCV RBC AUTO: 98 FL (ref 80–100)
MONOCYTES # BLD AUTO: 0.67 X10*3/UL (ref 0.1–1)
MONOCYTES NFR BLD AUTO: 6.4 %
NEUTROPHILS # BLD AUTO: 5.99 X10*3/UL (ref 1.2–7.7)
NEUTROPHILS NFR BLD AUTO: 56.8 %
NITRITE UR QL STRIP.AUTO: NEGATIVE
NRBC BLD-RTO: 0 /100 WBCS (ref 0–0)
PH UR STRIP.AUTO: 6.5 [PH]
PLATELET # BLD AUTO: 278 X10*3/UL (ref 150–450)
POTASSIUM SERPL-SCNC: 3.8 MMOL/L (ref 3.5–5.3)
PROT SERPL-MCNC: 6.9 G/DL (ref 6.4–8.2)
PROT UR STRIP.AUTO-MCNC: NEGATIVE MG/DL
RBC # BLD AUTO: 4.66 X10*6/UL (ref 4–5.2)
RBC # UR STRIP.AUTO: NEGATIVE /UL
SARS-COV-2 RNA RESP QL NAA+PROBE: NOT DETECTED
SODIUM SERPL-SCNC: 138 MMOL/L (ref 136–145)
SP GR UR STRIP.AUTO: 1.01
UROBILINOGEN UR STRIP.AUTO-MCNC: NORMAL MG/DL
WBC # BLD AUTO: 10.5 X10*3/UL (ref 4.4–11.3)

## 2024-11-21 PROCEDURE — 96375 TX/PRO/DX INJ NEW DRUG ADDON: CPT

## 2024-11-21 PROCEDURE — 2500000004 HC RX 250 GENERAL PHARMACY W/ HCPCS (ALT 636 FOR OP/ED): Mod: SE | Performed by: STUDENT IN AN ORGANIZED HEALTH CARE EDUCATION/TRAINING PROGRAM

## 2024-11-21 PROCEDURE — 80053 COMPREHEN METABOLIC PANEL: CPT | Performed by: STUDENT IN AN ORGANIZED HEALTH CARE EDUCATION/TRAINING PROGRAM

## 2024-11-21 PROCEDURE — 36415 COLL VENOUS BLD VENIPUNCTURE: CPT | Performed by: STUDENT IN AN ORGANIZED HEALTH CARE EDUCATION/TRAINING PROGRAM

## 2024-11-21 PROCEDURE — 83880 ASSAY OF NATRIURETIC PEPTIDE: CPT | Performed by: STUDENT IN AN ORGANIZED HEALTH CARE EDUCATION/TRAINING PROGRAM

## 2024-11-21 PROCEDURE — 83690 ASSAY OF LIPASE: CPT | Performed by: STUDENT IN AN ORGANIZED HEALTH CARE EDUCATION/TRAINING PROGRAM

## 2024-11-21 PROCEDURE — 96374 THER/PROPH/DIAG INJ IV PUSH: CPT

## 2024-11-21 PROCEDURE — 71045 X-RAY EXAM CHEST 1 VIEW: CPT | Mod: FOREIGN READ | Performed by: RADIOLOGY

## 2024-11-21 PROCEDURE — 87636 SARSCOV2 & INF A&B AMP PRB: CPT | Performed by: STUDENT IN AN ORGANIZED HEALTH CARE EDUCATION/TRAINING PROGRAM

## 2024-11-21 PROCEDURE — 85025 COMPLETE CBC W/AUTO DIFF WBC: CPT | Performed by: STUDENT IN AN ORGANIZED HEALTH CARE EDUCATION/TRAINING PROGRAM

## 2024-11-21 PROCEDURE — 84484 ASSAY OF TROPONIN QUANT: CPT | Performed by: STUDENT IN AN ORGANIZED HEALTH CARE EDUCATION/TRAINING PROGRAM

## 2024-11-21 PROCEDURE — 83735 ASSAY OF MAGNESIUM: CPT | Performed by: STUDENT IN AN ORGANIZED HEALTH CARE EDUCATION/TRAINING PROGRAM

## 2024-11-21 PROCEDURE — 81003 URINALYSIS AUTO W/O SCOPE: CPT | Performed by: STUDENT IN AN ORGANIZED HEALTH CARE EDUCATION/TRAINING PROGRAM

## 2024-11-21 PROCEDURE — 93005 ELECTROCARDIOGRAM TRACING: CPT

## 2024-11-21 PROCEDURE — 99284 EMERGENCY DEPT VISIT MOD MDM: CPT | Mod: 25

## 2024-11-21 PROCEDURE — 71045 X-RAY EXAM CHEST 1 VIEW: CPT

## 2024-11-21 PROCEDURE — 83605 ASSAY OF LACTIC ACID: CPT | Performed by: STUDENT IN AN ORGANIZED HEALTH CARE EDUCATION/TRAINING PROGRAM

## 2024-11-21 RX ORDER — KETOROLAC TROMETHAMINE 15 MG/ML
15 INJECTION, SOLUTION INTRAMUSCULAR; INTRAVENOUS ONCE
Status: COMPLETED | OUTPATIENT
Start: 2024-11-21 | End: 2024-11-21

## 2024-11-21 RX ORDER — ONDANSETRON HYDROCHLORIDE 2 MG/ML
4 INJECTION, SOLUTION INTRAVENOUS ONCE
Status: COMPLETED | OUTPATIENT
Start: 2024-11-21 | End: 2024-11-21

## 2024-11-21 ASSESSMENT — COLUMBIA-SUICIDE SEVERITY RATING SCALE - C-SSRS
2. HAVE YOU ACTUALLY HAD ANY THOUGHTS OF KILLING YOURSELF?: NO
1. IN THE PAST MONTH, HAVE YOU WISHED YOU WERE DEAD OR WISHED YOU COULD GO TO SLEEP AND NOT WAKE UP?: NO
6. HAVE YOU EVER DONE ANYTHING, STARTED TO DO ANYTHING, OR PREPARED TO DO ANYTHING TO END YOUR LIFE?: NO

## 2024-11-21 ASSESSMENT — PAIN DESCRIPTION - PAIN TYPE: TYPE: ACUTE PAIN

## 2024-11-21 ASSESSMENT — PAIN DESCRIPTION - ORIENTATION: ORIENTATION: MID

## 2024-11-21 ASSESSMENT — HEART SCORE
HISTORY: SLIGHTLY SUSPICIOUS
TROPONIN: LESS THAN OR EQUAL TO NORMAL LIMIT
HEART SCORE: 1
RISK FACTORS: 1-2 RISK FACTORS
ECG: NORMAL
AGE: <45

## 2024-11-21 ASSESSMENT — PAIN DESCRIPTION - DESCRIPTORS
DESCRIPTORS: ACHING
DESCRIPTORS: ACHING

## 2024-11-21 ASSESSMENT — PAIN SCALES - GENERAL
PAINLEVEL_OUTOF10: 4
PAINLEVEL_OUTOF10: 9
PAINLEVEL_OUTOF10: 4
PAINLEVEL_OUTOF10: 9

## 2024-11-21 ASSESSMENT — PAIN - FUNCTIONAL ASSESSMENT: PAIN_FUNCTIONAL_ASSESSMENT: 0-10

## 2024-11-21 ASSESSMENT — PAIN DESCRIPTION - LOCATION: LOCATION: CHEST

## 2024-11-21 NOTE — ED TRIAGE NOTES
Pt in with midsternal non radiating chest pain that started yesterday while she was sitting down, was seen at [facility] and states they didn't do anything for her and was discharged, pt reports pain is 9/10 still, denies N/V, SOB. Pt also reports RLQ pain. ABCs intact, no acute distress noted.

## 2024-11-21 NOTE — ED PROVIDER NOTES
Chief Complaint: Chest pain  HPI: This is a 44-year-old female, presenting to the emergency department for midsternal chest pain which began 2 days ago and increased in severity today.  Patient states that she was seen at an outside hospital for the chest pain, and was discharged home.  She states that since she is still having the pain she presents is emergency department for further evaluation.  She denies any associated shortness of breath, cough, congestion, fevers, chills.  She denies any nausea or vomiting.  She denies any trauma or injury.    Past Medical History:   Diagnosis Date    Seizures (Multi)       History reviewed. No pertinent surgical history.    Physical Exam  Vitals and nursing note reviewed.   Constitutional:       Appearance: Normal appearance.   HENT:      Head: Normocephalic and atraumatic.      Mouth/Throat:      Mouth: Mucous membranes are moist.   Eyes:      Conjunctiva/sclera: Conjunctivae normal.   Cardiovascular:      Rate and Rhythm: Normal rate.      Heart sounds: Normal heart sounds.   Pulmonary:      Effort: Pulmonary effort is normal.      Breath sounds: Normal breath sounds.   Abdominal:      General: Abdomen is flat.      Palpations: Abdomen is soft.   Musculoskeletal:         General: Normal range of motion.      Cervical back: Normal range of motion.   Skin:     General: Skin is warm and dry.   Neurological:      General: No focal deficit present.      Mental Status: She is alert.   Psychiatric:         Mood and Affect: Mood normal.          ED Course/MDM  Diagnoses as of 11/21/24 0507   Chest pain, unspecified type     EKG interpreted by myself (ED attending physician): Normal sinus rhythm, rate of 70, normal axis, normal intervals, no ST segment changes, nonischemic EKG    This is a 44 y.o. female presenting to the ED for evaluation of chest pain which began 2 days ago and has not resolved.  On physical exam, the patient is resting comfortably in bed, no acute distress.   Heart is regular rate and rhythm, lungs are clear auscultation bilaterally.  There is no chest wall tenderness palpation.  Lab work was overall grossly unremarkable including negative delta Trope.  I did review the patient's lab work from her [facility] visit yesterday, and she had a negative D-dimer at that time, I do not feel that repeat D-dimer or CT imaging is indicated at this time.  I do feel that the patient is safe and stable for outpatient follow-up.  She was advised to return to the emergency department for any new or worsening symptoms and was discharged home in stable condition.    Final Impression  1.  Chest pain  Disposition/Plan: Discharge  Condition at disposition: Stable.     Lala Coreas DO  Emergency Medicine Physician     Lala Coreas,   11/21/24 0585

## 2024-11-22 LAB
ATRIAL RATE: 70 BPM
P AXIS: 28 DEGREES
P OFFSET: 198 MS
P ONSET: 145 MS
PR INTERVAL: 138 MS
Q ONSET: 214 MS
QRS COUNT: 11 BEATS
QRS DURATION: 84 MS
QT INTERVAL: 372 MS
QTC CALCULATION(BAZETT): 401 MS
QTC FREDERICIA: 391 MS
R AXIS: -7 DEGREES
T AXIS: 12 DEGREES
T OFFSET: 400 MS
VENTRICULAR RATE: 70 BPM

## 2025-02-19 ENCOUNTER — HOSPITAL ENCOUNTER (EMERGENCY)
Facility: HOSPITAL | Age: 45
Discharge: HOME | End: 2025-02-19
Attending: EMERGENCY MEDICINE
Payer: COMMERCIAL

## 2025-02-19 ENCOUNTER — APPOINTMENT (OUTPATIENT)
Dept: RADIOLOGY | Facility: HOSPITAL | Age: 45
End: 2025-02-19
Payer: COMMERCIAL

## 2025-02-19 VITALS
SYSTOLIC BLOOD PRESSURE: 136 MMHG | TEMPERATURE: 98.1 F | WEIGHT: 251 LBS | DIASTOLIC BLOOD PRESSURE: 84 MMHG | RESPIRATION RATE: 18 BRPM | HEIGHT: 67 IN | OXYGEN SATURATION: 96 % | HEART RATE: 95 BPM | BODY MASS INDEX: 39.39 KG/M2

## 2025-02-19 DIAGNOSIS — F17.200 TOBACCO USE DISORDER: ICD-10-CM

## 2025-02-19 DIAGNOSIS — J18.9 PNEUMONIA OF BOTH LOWER LOBES DUE TO INFECTIOUS ORGANISM: ICD-10-CM

## 2025-02-19 DIAGNOSIS — J11.1 INFLUENZA: Primary | ICD-10-CM

## 2025-02-19 LAB
FLUAV RNA RESP QL NAA+PROBE: DETECTED
FLUBV RNA RESP QL NAA+PROBE: NOT DETECTED
SARS-COV-2 RNA RESP QL NAA+PROBE: NOT DETECTED

## 2025-02-19 PROCEDURE — 71045 X-RAY EXAM CHEST 1 VIEW: CPT

## 2025-02-19 PROCEDURE — 99284 EMERGENCY DEPT VISIT MOD MDM: CPT | Mod: 25 | Performed by: EMERGENCY MEDICINE

## 2025-02-19 PROCEDURE — 87636 SARSCOV2 & INF A&B AMP PRB: CPT | Performed by: PHYSICIAN ASSISTANT

## 2025-02-19 PROCEDURE — 2500000001 HC RX 250 WO HCPCS SELF ADMINISTERED DRUGS (ALT 637 FOR MEDICARE OP): Mod: SE | Performed by: EMERGENCY MEDICINE

## 2025-02-19 PROCEDURE — 71045 X-RAY EXAM CHEST 1 VIEW: CPT | Performed by: RADIOLOGY

## 2025-02-19 PROCEDURE — 2500000004 HC RX 250 GENERAL PHARMACY W/ HCPCS (ALT 636 FOR OP/ED): Mod: SE

## 2025-02-19 RX ORDER — ONDANSETRON 4 MG/1
4 TABLET, ORALLY DISINTEGRATING ORAL ONCE
Status: COMPLETED | OUTPATIENT
Start: 2025-02-19 | End: 2025-02-19

## 2025-02-19 RX ORDER — ALBUTEROL SULFATE 90 UG/1
1-2 INHALANT RESPIRATORY (INHALATION) EVERY 4 HOURS PRN
Qty: 18 G | Refills: 0 | Status: SHIPPED | OUTPATIENT
Start: 2025-02-19 | End: 2025-03-21

## 2025-02-19 RX ORDER — ONDANSETRON 4 MG/1
TABLET, ORALLY DISINTEGRATING ORAL
Status: COMPLETED
Start: 2025-02-19 | End: 2025-02-19

## 2025-02-19 RX ORDER — BENZONATATE 100 MG/1
100 CAPSULE ORAL EVERY 8 HOURS
Qty: 21 CAPSULE | Refills: 0 | Status: SHIPPED | OUTPATIENT
Start: 2025-02-19 | End: 2025-02-26

## 2025-02-19 RX ORDER — DOXYCYCLINE HYCLATE 50 MG/1
100 CAPSULE, GELATIN COATED ORAL ONCE
Status: COMPLETED | OUTPATIENT
Start: 2025-02-19 | End: 2025-02-19

## 2025-02-19 RX ORDER — DOXYCYCLINE 100 MG/1
100 CAPSULE ORAL 2 TIMES DAILY
Qty: 20 CAPSULE | Refills: 0 | Status: SHIPPED | OUTPATIENT
Start: 2025-02-19 | End: 2025-03-01

## 2025-02-19 RX ADMIN — DOXYCYCLINE HYCLATE 100 MG: 50 CAPSULE ORAL at 23:10

## 2025-02-19 RX ADMIN — ONDANSETRON 4 MG: 4 TABLET, ORALLY DISINTEGRATING ORAL at 22:49

## 2025-02-19 ASSESSMENT — PAIN - FUNCTIONAL ASSESSMENT: PAIN_FUNCTIONAL_ASSESSMENT: 0-10

## 2025-02-19 ASSESSMENT — PAIN SCALES - GENERAL: PAINLEVEL_OUTOF10: 0 - NO PAIN

## 2025-02-20 NOTE — ED PROVIDER NOTES
HPI   Chief Complaint   Patient presents with    Cough     Cough and some wheezing that started today. Some sinus drainage        44-year-old female presents emergency department complaining of cough and wheezing and shortness of breath.  States that it started yesterday.  Also states she has some drainage.  Denies any fevers.  Slightly productive cough with yellowish phlegm.  Chest hurts with cough.  No other complaints.              Patient History   Past Medical History:   Diagnosis Date    Seizures (Multi)      No past surgical history on file.  No family history on file.  Social History     Tobacco Use    Smoking status: Every Day     Current packs/day: 0.50     Types: Cigarettes    Smokeless tobacco: Current   Vaping Use    Vaping status: Every Day   Substance Use Topics    Alcohol use: Not Currently    Drug use: Never       Physical Exam   ED Triage Vitals [02/19/25 2119]   Temperature Heart Rate Respirations BP   37.2 °C (98.9 °F) (!) 110 20 128/84      Pulse Ox Temp src Heart Rate Source Patient Position   98 % -- Monitor Sitting      BP Location FiO2 (%)     Left arm --       Physical Exam  HENT:      Head: Normocephalic and atraumatic.      Mouth/Throat:      Mouth: Mucous membranes are moist.   Eyes:      Extraocular Movements: Extraocular movements intact.      Pupils: Pupils are equal, round, and reactive to light.   Cardiovascular:      Rate and Rhythm: Normal rate and regular rhythm.   Pulmonary:      Effort: Pulmonary effort is normal.      Breath sounds: Normal breath sounds.   Abdominal:      Palpations: Abdomen is soft.      Tenderness: There is abdominal tenderness.   Musculoskeletal:         General: Normal range of motion.   Skin:     General: Skin is warm and dry.   Neurological:      General: No focal deficit present.      Mental Status: She is alert.           ED Course & MDM   Diagnoses as of 02/20/25 0804   Influenza   Pneumonia of both lower lobes due to infectious organism   Tobacco use  disorder                 No data recorded                         Labs Reviewed   SARS-COV-2 AND INFLUENZA A/B PCR - Abnormal       Result Value    Flu A Result Detected (*)     Flu B Result Not Detected      Coronavirus 2019, PCR Not Detected      Narrative:     This assay is an FDA-cleared, in vitro diagnostic nucleic acid amplification test for the qualitative detection and differentiation of SARS CoV-2/ Influenza A/B from nasopharyngeal specimens collected from individuals with signs and symptoms of respiratory tract infections, and has been validated for use at [facility]. Negative results do not preclude COVID-19/ Influenza A/B infections and should not be used as the sole basis for diagnosis, treatment, or other management decisions. Testing for SARS CoV-2 is recommended only for patients who meet current clinical and/or epidemiological criteria defined by federal, state, or local public health directives.     XR chest 1 view   Final Result   1. Mild bibasilar patchy airspace disease likely atelectasis with   superimposed infectious process difficult to exclude in the proper   clinical setting             MACRO:   None        Signed by: Ameya Santizo 2/19/2025 10:16 PM   Dictation workstation:   [id number]                Medical Decision Making  Presents to the emergency department complaining of cough and shortness of breath.  Differential includes pneumonia, bronchitis, URI, viral illness.  She is found to have the flu.  Chest x-ray which I reviewed and independent interpreted shows questionable infiltrate at the bases.  She is a smoker.  She will be covered with antibiotics.  She will be given a prescription for an inhaler and Tessalon Perles for the cough.  She is to follow-up with primary care.  Recommendation for her to stop smoking and vaping.        Procedure  Procedures     Pastor Kim MD  02/19/25 1313       Pastor Kim MD  02/20/25 8330

## 2025-02-21 ENCOUNTER — APPOINTMENT (OUTPATIENT)
Dept: CARDIOLOGY | Facility: HOSPITAL | Age: 45
End: 2025-02-21
Payer: COMMERCIAL

## 2025-02-21 ENCOUNTER — HOSPITAL ENCOUNTER (EMERGENCY)
Facility: HOSPITAL | Age: 45
Discharge: HOME | End: 2025-02-22
Attending: STUDENT IN AN ORGANIZED HEALTH CARE EDUCATION/TRAINING PROGRAM
Payer: COMMERCIAL

## 2025-02-21 ENCOUNTER — APPOINTMENT (OUTPATIENT)
Dept: RADIOLOGY | Facility: HOSPITAL | Age: 45
End: 2025-02-21
Payer: COMMERCIAL

## 2025-02-21 DIAGNOSIS — J18.9 PNEUMONIA DUE TO INFECTIOUS ORGANISM, UNSPECIFIED LATERALITY, UNSPECIFIED PART OF LUNG: Primary | ICD-10-CM

## 2025-02-21 LAB
ALBUMIN SERPL BCP-MCNC: 4.1 G/DL (ref 3.4–5)
ALP SERPL-CCNC: 78 U/L (ref 33–110)
ALT SERPL W P-5'-P-CCNC: 19 U/L (ref 7–45)
ANION GAP SERPL CALC-SCNC: 14 MMOL/L (ref 10–20)
AST SERPL W P-5'-P-CCNC: 18 U/L (ref 9–39)
BASOPHILS # BLD AUTO: 0.02 X10*3/UL (ref 0–0.1)
BASOPHILS NFR BLD AUTO: 0.3 %
BILIRUB SERPL-MCNC: 0.3 MG/DL (ref 0–1.2)
BUN SERPL-MCNC: 8 MG/DL (ref 6–23)
CALCIUM SERPL-MCNC: 9.2 MG/DL (ref 8.6–10.3)
CHLORIDE SERPL-SCNC: 103 MMOL/L (ref 98–107)
CO2 SERPL-SCNC: 23 MMOL/L (ref 21–32)
CREAT SERPL-MCNC: 0.71 MG/DL (ref 0.5–1.05)
EGFRCR SERPLBLD CKD-EPI 2021: >90 ML/MIN/1.73M*2
EOSINOPHIL # BLD AUTO: 0.15 X10*3/UL (ref 0–0.7)
EOSINOPHIL NFR BLD AUTO: 2.6 %
ERYTHROCYTE [DISTWIDTH] IN BLOOD BY AUTOMATED COUNT: 12.5 % (ref 11.5–14.5)
GLUCOSE SERPL-MCNC: 116 MG/DL (ref 74–99)
HCT VFR BLD AUTO: 46.4 % (ref 36–46)
HGB BLD-MCNC: 15.8 G/DL (ref 12–16)
IMM GRANULOCYTES # BLD AUTO: 0.01 X10*3/UL (ref 0–0.7)
IMM GRANULOCYTES NFR BLD AUTO: 0.2 % (ref 0–0.9)
LYMPHOCYTES # BLD AUTO: 1.72 X10*3/UL (ref 1.2–4.8)
LYMPHOCYTES NFR BLD AUTO: 30 %
MCH RBC QN AUTO: 32.3 PG (ref 26–34)
MCHC RBC AUTO-ENTMCNC: 34.1 G/DL (ref 32–36)
MCV RBC AUTO: 95 FL (ref 80–100)
MONOCYTES # BLD AUTO: 0.56 X10*3/UL (ref 0.1–1)
MONOCYTES NFR BLD AUTO: 9.8 %
NEUTROPHILS # BLD AUTO: 3.28 X10*3/UL (ref 1.2–7.7)
NEUTROPHILS NFR BLD AUTO: 57.1 %
NRBC BLD-RTO: 0 /100 WBCS (ref 0–0)
PLATELET # BLD AUTO: 185 X10*3/UL (ref 150–450)
POTASSIUM SERPL-SCNC: 3.5 MMOL/L (ref 3.5–5.3)
PROT SERPL-MCNC: 7 G/DL (ref 6.4–8.2)
RBC # BLD AUTO: 4.89 X10*6/UL (ref 4–5.2)
SODIUM SERPL-SCNC: 136 MMOL/L (ref 136–145)
WBC # BLD AUTO: 5.7 X10*3/UL (ref 4.4–11.3)

## 2025-02-21 PROCEDURE — 71275 CT ANGIOGRAPHY CHEST: CPT | Performed by: SURGERY

## 2025-02-21 PROCEDURE — 93005 ELECTROCARDIOGRAM TRACING: CPT

## 2025-02-21 PROCEDURE — 85025 COMPLETE CBC W/AUTO DIFF WBC: CPT | Performed by: STUDENT IN AN ORGANIZED HEALTH CARE EDUCATION/TRAINING PROGRAM

## 2025-02-21 PROCEDURE — 71275 CT ANGIOGRAPHY CHEST: CPT

## 2025-02-21 PROCEDURE — 36415 COLL VENOUS BLD VENIPUNCTURE: CPT | Performed by: STUDENT IN AN ORGANIZED HEALTH CARE EDUCATION/TRAINING PROGRAM

## 2025-02-21 PROCEDURE — 99285 EMERGENCY DEPT VISIT HI MDM: CPT | Mod: 25 | Performed by: STUDENT IN AN ORGANIZED HEALTH CARE EDUCATION/TRAINING PROGRAM

## 2025-02-21 PROCEDURE — 80053 COMPREHEN METABOLIC PANEL: CPT | Performed by: STUDENT IN AN ORGANIZED HEALTH CARE EDUCATION/TRAINING PROGRAM

## 2025-02-21 PROCEDURE — 2550000001 HC RX 255 CONTRASTS: Mod: SE | Performed by: STUDENT IN AN ORGANIZED HEALTH CARE EDUCATION/TRAINING PROGRAM

## 2025-02-21 RX ADMIN — IOHEXOL 75 ML: 350 INJECTION, SOLUTION INTRAVENOUS at 23:36

## 2025-02-21 ASSESSMENT — COLUMBIA-SUICIDE SEVERITY RATING SCALE - C-SSRS
2. HAVE YOU ACTUALLY HAD ANY THOUGHTS OF KILLING YOURSELF?: NO
6. HAVE YOU EVER DONE ANYTHING, STARTED TO DO ANYTHING, OR PREPARED TO DO ANYTHING TO END YOUR LIFE?: NO
1. IN THE PAST MONTH, HAVE YOU WISHED YOU WERE DEAD OR WISHED YOU COULD GO TO SLEEP AND NOT WAKE UP?: NO

## 2025-02-21 ASSESSMENT — PAIN - FUNCTIONAL ASSESSMENT: PAIN_FUNCTIONAL_ASSESSMENT: 0-10

## 2025-02-21 ASSESSMENT — PAIN SCALES - GENERAL: PAINLEVEL_OUTOF10: 0 - NO PAIN

## 2025-02-22 VITALS
BODY MASS INDEX: 39.39 KG/M2 | SYSTOLIC BLOOD PRESSURE: 148 MMHG | RESPIRATION RATE: 20 BRPM | WEIGHT: 251 LBS | OXYGEN SATURATION: 98 % | HEART RATE: 91 BPM | DIASTOLIC BLOOD PRESSURE: 68 MMHG | TEMPERATURE: 98.1 F | HEIGHT: 67 IN

## 2025-02-22 RX ORDER — AMOXICILLIN AND CLAVULANATE POTASSIUM 875; 125 MG/1; MG/1
1 TABLET, FILM COATED ORAL EVERY 12 HOURS
Qty: 14 TABLET | Refills: 0 | Status: SHIPPED | OUTPATIENT
Start: 2025-02-22 | End: 2025-03-01

## 2025-02-22 ASSESSMENT — PAIN SCALES - GENERAL: PAINLEVEL_OUTOF10: 0 - NO PAIN

## 2025-02-22 NOTE — ED TRIAGE NOTES
Pt ambulatory into the ED c/o coughing up bright red blood the size of a chriss or less one time earlier today while coughing hasrshly.  Pt denies any difficulty breathing and states the harsh cough is d/t being diagnosed with pneumonia and Influenza A on the 19th of February. Pt had no dyspnea on exertion while ambulating to the room and oxygen at 97% RA.

## 2025-02-24 NOTE — ED PROVIDER NOTES
Chief Complaint: Coughing up blood  HPI: This is a 44-year-old female, presenting to the emergency department for worsening cough, and coughing up blood which occurred just prior to arrival.  Patient states that there is streaks of blood in her sputum.  She denies coughing up blood clots.  She states she has had a very harsh cough after being diagnosed with pneumonia and flu.  Patient denies any chest pain.  She denies any recent injury or trauma.  She denies any risk for PE.    Past Medical History:   Diagnosis Date    Seizures (Multi)       History reviewed. No pertinent surgical history.    Physical Exam  Vitals and nursing note reviewed.   Constitutional:       Appearance: Normal appearance.   HENT:      Head: Normocephalic and atraumatic.      Mouth/Throat:      Mouth: Mucous membranes are moist.   Eyes:      Conjunctiva/sclera: Conjunctivae normal.   Cardiovascular:      Rate and Rhythm: Normal rate.   Pulmonary:      Effort: Pulmonary effort is normal.   Abdominal:      General: Abdomen is flat.      Palpations: Abdomen is soft.   Musculoskeletal:         General: Normal range of motion.      Cervical back: Normal range of motion.   Skin:     General: Skin is warm and dry.   Neurological:      General: No focal deficit present.      Mental Status: She is alert.   Psychiatric:         Mood and Affect: Mood normal.            ED Course/MDM  Diagnoses as of 02/24/25 0200   Pneumonia due to infectious organism, unspecified laterality, unspecified part of lung     This is a 44 y.o. female presenting to the ED for evaluation of coughing up blood and persistent cough which occurred today.  Patient states that she is being treated for pneumonia and influenza, and has had a harsh cough.  She noted some blood-tinged sputum that she coughed up, and became concerned so presents to the emergency department.  On physical exam, patient is resting comfortably in the bed, no acute distress.  Heart is regular rate and rhythm,  lungs are clear auscultation bilaterally.  Vitals are stable.  Lab work is overall grossly unremarkable.  CT PE study was obtained given the hemoptysis, and was grossly unremarkable.  Patient was advised to continue her outpatient medication, and was also prescribed Augmentin as she is currently on doxycycline.  She was advised to follow-up with her primary care provider and return to the emergency department for any new or worsening symptoms.  She was discharged home in stable condition.    Final Impression  1.  Pneumonia  Disposition/Plan: Discharge home  Condition at disposition: Stable.     Lala Coreas DO  Emergency Medicine Physician     Lala Coreas DO  02/24/25 0202

## 2025-02-28 LAB
ATRIAL RATE: 87 BPM
P AXIS: 1 DEGREES
P OFFSET: 213 MS
P ONSET: 153 MS
PR INTERVAL: 144 MS
Q ONSET: 225 MS
QRS COUNT: 15 BEATS
QRS DURATION: 82 MS
QT INTERVAL: 354 MS
QTC CALCULATION(BAZETT): 425 MS
QTC FREDERICIA: 400 MS
R AXIS: -28 DEGREES
T AXIS: 18 DEGREES
T OFFSET: 402 MS
VENTRICULAR RATE: 87 BPM

## 2025-03-07 NOTE — PROGRESS NOTES
HPI   43 yo presents as new pt for thyroid evaluation.  Pt with developmental delay, seizure disorder, asthma, gastric reflux.    -pt is euthyroid by history, no FH of thyroid disease  -no obstructive goiter system  -pt had a child 2023    Thyroid labs: 2024 TSH-4.66, 10/24 tsh-4.74,  ft4/ft3-wnl    -pt not sure if plans for more pregnancy, previously had IUD    PAST MEDICAL HISTORY   Diagnosis Date   Developmental delay   Epilepsy (HCC)   Family history of seizure disorder   Former smoker   Learning difficulty    disorder   Psychiatric disorder         PAST SURGICAL HISTORY   Procedure Laterality Date   BRAIN SURGERY HX age5 months and 5yo   hx of blood clot and bone graph-done at [facility]   CHOLECYSTECTOMY HX     Social history   -neg tobacco (previous +, vaping currently), neg alcohol    FAMILY HISTORY   Problem Relation Age of Onset   None Father   Hypertension Father   Liver Disease Mother   Seizures Paternal Uncle   -no FH of thyroid disease    Current Outpatient Medications:     acetaminophen (Tylenol) 325 mg tablet, Take 2 tablets (650 mg) by mouth every 6 hours if needed., Disp: , Rfl:     albuterol 90 mcg/actuation inhaler, Inhale 1-2 puffs every 4 hours if needed for wheezing., Disp: 18 g, Rfl: 0    divalproex (Depakote ER) 500 mg 24 hr tablet, Take 2 tablets (1,000 mg) by mouth once daily. Do not crush, chew, or split., Disp: 30 tablet, Rfl: 0    folic acid (Folvite) 1 mg tablet, Take 2 tablets (2 mg) by mouth once daily., Disp: , Rfl:     Nasal Spray, oxymetazoline, 0.05 % nasal spray, Use 2-3 Sprays in each nostril two times a day for 3 days. Do not exceed 3 days., Disp: , Rfl:     omeprazole (PriLOSEC) 40 mg DR capsule, Take 1 capsule (40 mg) by mouth once daily., Disp: , Rfl:     prenatal vitamin, iron-folic, (Prenatal Vitamin) 27 mg iron-800 mcg folic acid tablet, Take 1 tablet by mouth once daily., Disp: , Rfl:       Allergies as of 03/10/2025 - Reviewed 03/10/2025   Allergen  "Reaction Noted    Phenytoin Hives and Rash 07/22/2012    Dilantin [phenytoin sodium extended] Unknown 05/21/2024    Levetiracetam Rash 11/20/2024         Review of Systems   Cardiology: Lightheadedness-denies.  Chest pain-denies.  Leg edema-denies.  Palpitations-denies.  Respiratory: Cough-denies. Shortness of breath-denies.  Wheezing-denies.  Gastroenterology: Constipation-denies.  Diarrhea-denies.  Heartburn +  Endocrinology: Cold intolerance-denies.  Heat intolerance-denies.  Sweats-denies.  Neurology: Headache-denies.  Tremor-denies.  Neuropathy in extremities-denies.  Psychology: Low energy-denies.  Irritability-denies.  Sleep disturbances-denies.      /86 (BP Location: Left arm, Patient Position: Sitting)   Pulse 100   Ht 1.702 m (5' 7\")   Wt 125 kg (275 lb)   BMI 43.07 kg/m²       Labs:  Lab Results   Component Value Date    WBC 5.7 02/21/2025    NRBC 0.0 02/21/2025    RBC 4.89 02/21/2025    HGB 15.8 02/21/2025    HCT 46.4 (H) 02/21/2025     02/21/2025     Lab Results   Component Value Date    CALCIUM 9.2 02/21/2025    AST 18 02/21/2025    ALKPHOS 78 02/21/2025    BILITOT 0.3 02/21/2025    PROT 7.0 02/21/2025    ALBUMIN 4.1 02/21/2025     02/21/2025    K 3.5 02/21/2025     02/21/2025    CO2 23 02/21/2025    ANIONGAP 14 02/21/2025    BUN 8 02/21/2025    CREATININE 0.71 02/21/2025    GLUCOSE 116 (H) 02/21/2025    ALT 19 02/21/2025    EGFR >90 02/21/2025       Lab Results   Component Value Date    TSH 4.74 (H) 10/29/2024     No results found for: \"[id number]\"  Lab Results   Component Value Date    HGBA1C 4.8 11/22/2024         Physical Exam   General Appearance: pleasant, cooperative, no acute distress  HEENT: no chemosis, no proptosis, no lid lag, no lid retraction  Neck: no lymphadenopathy, no thyromegaly, no dominant thyroid nodules  Heart: no murmurs, regular rate and rhythm, S1 and S2  Lungs: no wheezes, no rhonci, no rales  Extremities: no lower extremity " swelling      Assessment/Plan   1. Subclinical hypothyroidism (Primary)  -went over natural hx of hashimoto's disease and subclinical disease  -pt without sx  -she may want to have another baby, in this case then would suggest going on a low dose thyroid supplement    -start levothyoxine 50 mcg q am/empty stomach/4 hr apart from calcium/iron/heartburn meds    -if/when pregnant call office as we may need to adjust your thyroid dosage and follow through pregnancy          Follow Up:  Marlee 1 year    -labs/tests/notes reviewed  -reviewed and counseled patient on medication monitoring and side effects

## 2025-03-10 ENCOUNTER — APPOINTMENT (OUTPATIENT)
Dept: ENDOCRINOLOGY | Facility: CLINIC | Age: 45
End: 2025-03-10
Payer: COMMERCIAL

## 2025-03-10 VITALS
HEIGHT: 67 IN | DIASTOLIC BLOOD PRESSURE: 86 MMHG | WEIGHT: 275 LBS | BODY MASS INDEX: 43.16 KG/M2 | SYSTOLIC BLOOD PRESSURE: 113 MMHG | HEART RATE: 100 BPM

## 2025-03-10 DIAGNOSIS — E03.8 SUBCLINICAL HYPOTHYROIDISM: Primary | ICD-10-CM

## 2025-03-10 PROCEDURE — 3074F SYST BP LT 130 MM HG: CPT | Performed by: INTERNAL MEDICINE

## 2025-03-10 PROCEDURE — 3008F BODY MASS INDEX DOCD: CPT | Performed by: INTERNAL MEDICINE

## 2025-03-10 PROCEDURE — 99203 OFFICE O/P NEW LOW 30 MIN: CPT | Performed by: INTERNAL MEDICINE

## 2025-03-10 PROCEDURE — 3079F DIAST BP 80-89 MM HG: CPT | Performed by: INTERNAL MEDICINE

## 2025-03-10 RX ORDER — ACETAMINOPHEN 325 MG/1
650 TABLET ORAL EVERY 6 HOURS PRN
COMMUNITY
Start: 2023-06-30

## 2025-03-10 RX ORDER — B-COMPLEX WITH VITAMIN C
1 TABLET ORAL
COMMUNITY
Start: 2022-11-23

## 2025-03-10 RX ORDER — LEVOTHYROXINE SODIUM 50 UG/1
50 TABLET ORAL DAILY
Qty: 90 TABLET | Refills: 1 | Status: SHIPPED | OUTPATIENT
Start: 2025-03-10 | End: 2026-03-10

## 2025-03-10 RX ORDER — CALCIUM CARBONATE/VITAMIN D2 250 MG-125
TABLET ORAL
COMMUNITY
Start: 2024-07-16

## 2025-03-10 RX ORDER — OMEPRAZOLE 40 MG/1
40 CAPSULE, DELAYED RELEASE ORAL
COMMUNITY
Start: 2024-11-22

## 2025-03-10 RX ORDER — FOLIC ACID 1 MG/1
2 TABLET ORAL
COMMUNITY
Start: 2024-11-27 | End: 2025-11-27

## 2025-03-10 ASSESSMENT — ENCOUNTER SYMPTOMS
LOSS OF SENSATION IN FEET: 0
OCCASIONAL FEELINGS OF UNSTEADINESS: 0
DEPRESSION: 0

## 2025-03-10 ASSESSMENT — PAIN SCALES - GENERAL: PAINLEVEL_OUTOF10: 0-NO PAIN

## 2025-05-15 ENCOUNTER — HOSPITAL ENCOUNTER (EMERGENCY)
Facility: HOSPITAL | Age: 45
Discharge: HOME | End: 2025-05-15
Attending: EMERGENCY MEDICINE
Payer: COMMERCIAL

## 2025-05-15 ENCOUNTER — APPOINTMENT (OUTPATIENT)
Dept: RADIOLOGY | Facility: HOSPITAL | Age: 45
End: 2025-05-15
Payer: COMMERCIAL

## 2025-05-15 VITALS
OXYGEN SATURATION: 99 % | SYSTOLIC BLOOD PRESSURE: 145 MMHG | HEART RATE: 87 BPM | TEMPERATURE: 98.5 F | DIASTOLIC BLOOD PRESSURE: 83 MMHG | RESPIRATION RATE: 16 BRPM

## 2025-05-15 DIAGNOSIS — S60.221A CONTUSION OF RIGHT HAND, INITIAL ENCOUNTER: Primary | ICD-10-CM

## 2025-05-15 PROCEDURE — 73130 X-RAY EXAM OF HAND: CPT | Mod: RIGHT SIDE | Performed by: RADIOLOGY

## 2025-05-15 PROCEDURE — 73110 X-RAY EXAM OF WRIST: CPT | Mod: RT

## 2025-05-15 PROCEDURE — 2500000001 HC RX 250 WO HCPCS SELF ADMINISTERED DRUGS (ALT 637 FOR MEDICARE OP): Mod: SE

## 2025-05-15 PROCEDURE — 99284 EMERGENCY DEPT VISIT MOD MDM: CPT | Performed by: EMERGENCY MEDICINE

## 2025-05-15 PROCEDURE — 73130 X-RAY EXAM OF HAND: CPT | Mod: RT

## 2025-05-15 PROCEDURE — 73110 X-RAY EXAM OF WRIST: CPT | Mod: RIGHT SIDE | Performed by: RADIOLOGY

## 2025-05-15 RX ORDER — IBUPROFEN 600 MG/1
600 TABLET ORAL ONCE
Status: COMPLETED | OUTPATIENT
Start: 2025-05-15 | End: 2025-05-15

## 2025-05-15 RX ORDER — IBUPROFEN 600 MG/1
TABLET ORAL
Status: COMPLETED
Start: 2025-05-15 | End: 2025-05-15

## 2025-05-15 RX ADMIN — IBUPROFEN 600 MG: 600 TABLET ORAL at 01:46

## 2025-05-15 ASSESSMENT — PAIN SCALES - GENERAL: PAINLEVEL_OUTOF10: 5 - MODERATE PAIN

## 2025-05-15 ASSESSMENT — PAIN - FUNCTIONAL ASSESSMENT: PAIN_FUNCTIONAL_ASSESSMENT: 0-10

## 2025-05-15 ASSESSMENT — PAIN DESCRIPTION - DESCRIPTORS: DESCRIPTORS: ACHING

## 2025-05-15 NOTE — ED PROVIDER NOTES
HPI   Chief Complaint   Patient presents with    Wrist Injury       Patient comes in with pain and swelling to the right hand predominantly the dorsum.  She hit another object in anger earlier.  She does have some swelling and bruising but full range of motion of digits and normal capillary refill.            Patient History   Medical History[1]  Surgical History[2]  Family History[3]  Social History[4]    Physical Exam   ED Triage Vitals [05/15/25 0034]   Temperature Heart Rate Respirations BP   36.9 °C (98.5 °F) 90 16 149/85      Pulse Ox Temp Source Heart Rate Source Patient Position   99 % Temporal -- --      BP Location FiO2 (%)     -- --       Physical Exam  Vitals and nursing note reviewed.   HENT:      Head: Normocephalic and atraumatic.      Right Ear: Ear canal normal.      Left Ear: Ear canal normal.      Nose: Nose normal.      Mouth/Throat:      Mouth: Mucous membranes are moist.   Cardiovascular:      Rate and Rhythm: Normal rate.   Pulmonary:      Effort: Pulmonary effort is normal.      Breath sounds: Normal breath sounds.   Abdominal:      General: Abdomen is flat.      Palpations: Abdomen is soft.   Musculoskeletal:         General: Swelling present. Normal range of motion.      Cervical back: Normal range of motion.      Comments: Swelling over the dorsum of the right hand   Skin:     General: Skin is warm and dry.   Neurological:      General: No focal deficit present.      Mental Status: She is alert.           ED Course & MDM   Diagnoses as of 05/15/25 0145   Contusion of right hand, initial encounter                 No data recorded     Christ Coma Scale Score: 15 (05/15/25 0034 : Rhonda Rodriguez RN)                           Medical Decision Making  X-ray by me was read as negative and corroborated by radiology.  We have placed an Ace wrap and encouraged her to elevate ice and use ibuprofen.  She does have a primary care provider with whom she can follow if this has not improved in the  next 3 to 4 days.        Procedure  Procedures       [1]   Past Medical History:  Diagnosis Date    Seizures (Multi)    [2] History reviewed. No pertinent surgical history.  [3] No family history on file.  [4]   Social History  Tobacco Use    Smoking status: Every Day     Current packs/day: 0.50     Types: Cigarettes    Smokeless tobacco: Current   Vaping Use    Vaping status: Every Day   Substance Use Topics    Alcohol use: Not Currently    Drug use: Never        Tristan Raines MD  05/15/25 0145

## 2025-05-15 NOTE — ED TRIAGE NOTES
Patient injured hand on side of door. Right lateral hand and wrist slightly swollen and painful to touch.

## 2025-08-12 ENCOUNTER — APPOINTMENT (OUTPATIENT)
Dept: OBSTETRICS AND GYNECOLOGY | Facility: CLINIC | Age: 45
End: 2025-08-12
Payer: COMMERCIAL

## 2025-09-02 ENCOUNTER — APPOINTMENT (OUTPATIENT)
Dept: OBSTETRICS AND GYNECOLOGY | Facility: CLINIC | Age: 45
End: 2025-09-02
Payer: COMMERCIAL

## 2026-03-10 ENCOUNTER — APPOINTMENT (OUTPATIENT)
Dept: ENDOCRINOLOGY | Facility: CLINIC | Age: 46
End: 2026-03-10
Payer: COMMERCIAL